# Patient Record
Sex: MALE | Race: WHITE | NOT HISPANIC OR LATINO | Employment: FULL TIME | URBAN - METROPOLITAN AREA
[De-identification: names, ages, dates, MRNs, and addresses within clinical notes are randomized per-mention and may not be internally consistent; named-entity substitution may affect disease eponyms.]

---

## 2023-12-09 DIAGNOSIS — E78.49 OTHER HYPERLIPIDEMIA: Primary | ICD-10-CM

## 2023-12-11 ENCOUNTER — TELEMEDICINE (OUTPATIENT)
Dept: BEHAVIORAL HEALTH | Facility: CLINIC | Age: 58
End: 2023-12-11
Payer: COMMERCIAL

## 2023-12-11 DIAGNOSIS — F33.42 RECURRENT MAJOR DEPRESSIVE DISORDER, IN FULL REMISSION (CMS-HCC): ICD-10-CM

## 2023-12-11 PROCEDURE — 99214 OFFICE O/P EST MOD 30 MIN: CPT

## 2023-12-11 RX ORDER — ATORVASTATIN CALCIUM 40 MG/1
40 TABLET, FILM COATED ORAL DAILY
Qty: 90 TABLET | Refills: 0 | Status: SHIPPED | OUTPATIENT
Start: 2023-12-11 | End: 2024-01-30 | Stop reason: SDUPTHER

## 2023-12-11 NOTE — PROGRESS NOTES
"Subjective   Patient ID: Garfield Blankenship is a 58 y.o. male  with past history of allergic rhinitis, anxiety, depression, vitamin D deficiency, and KINA with poor CPAP compliance. Patient presents for follow up evaluation of depression and anxiety      \"Chief Complaint -  Follow up for depression and anxiety      Patient provided 2 personal identifiers prior to virtual Teleconferencing appointment.     Patient reports that he is doing well and reports being at his \"status quo.\" He describes having no worries or concerns.     Patient is sleeping well and he describes his appetite as good.     Patient denying depressed mood, suicidal ideation, or homicidal ideation.     He denies daily worries, higher than normal baseline anxiety, and no panic attacks.     He is looking forward to his children coming to town for the holidays and he describes his wife as being busy getting the house in order for the holidays.      Patient keeps busy at work as an  at [employer].     Patient denying physical or mental health symptoms today.   Review of Systems     Depressive Symptoms: not depressed or irritable mood, ~no loss of interest,~no change in appetite,~no recent lb weight gain,~no recent lb weight loss,~no insomnia,~no fatigue or loss of energy,~not feeling worthless or guilty,~normal concentration,~ability to make decisions,~no suicidal ideation,~no guns or weapons in household.   Manic Symptoms: mood is not irritable or elevated,~self esteem is not grandiose or increased,~no changes in need for sleep,~not more talkative than usual,~does not have flight of ideas or racing thoughts,~no distractibility,~no psychomotor agitation or increased goal-directed activity,~no excessive involvement in pleasurable activities.   Psychotic Symptoms: no hallucinations,~no delusions,~no disorganized speech,~does not have disorganized behavior or catatonia,~no negative symptoms.   Anxiety Symptoms: mild  worry,~difficulty controlling " worry, but~no panic attacks,~no concerns about future panic attacks,~no worry about panic attack consequences,~no change in behavior due to panic attacks,~no increase in arousal,~not easily fatigued due to worry,~no difficulty concentrating due to worry,~no irritability due to worry,~no muscle tension due to worry,~no sleep disturbances due to worry,~no specific phobia,~no social phobia,~no obsessions,~no compulsions,~no exposure to traumatic event,~no re-experiencing of traumatic event,~no avoidance of stimuli and number of responsiveness,~no restlessness / feeling on edge due to worry.   Delirium/ Altered Mental Status Symptoms: no disturbances of consciousness,~no diminished ability to focus, sustain, shift attention,~no change in cognition or perceptual disturbances,~symptoms do not fluctuate during the course of the day,~general medical condition is not present.   Disordered Eating Symptoms: weight is not less than 85% of ideal body weight.   Post-traumatic stress disorder symptoms The patient is currently asymptomatic.   innattentive Symptoms: does not make careless mistakes often,~does not have difficulty paying attention,~not often disorganized,~does not lose things often,~is not easily distracted,~is not often forgetful,~does not avoid/dislike tasks with sustained mental effort,~listens when spoken to directly,~is able to follow instructions and finish schoolwork.   All other systems have been reviewed and are negative for complaint.      Constitutional: sleep apnea, but~normal sleeping,~no night wakings,~no snoring,~not a picky eater,~normal appetite,~no swallowing problems,~no night terrors,~no nightmares,~no restless sleep,~no snorts/gasps~and~no obesity.   Eyes: wears glasses/contacts.   ENT: no hearing loss.   Cardiovascular: no chest pain,~no palpitations~and~no syncope.   Respiratory: no wheezing~and~no asthma/reactive airway disease.   Gastrointestinal: no constipation,~no abdominal pain,~no  "nausea,~no vomiting,~no diarrhea,~no blood in stools~and~no reflux.   Genitourinary: no nocturnal enuresis~and~no incontinence.   Musculoskeletal: moving all extremities well and symmetrical,~no arthritis or joint problems,~no myalgias~and~no muscle weakness.   Neurological: no headache,~no head injury,~no seizures,~no staring spells~and~no loss of consciousness.   Endocrine: no temperature intolerance~and~good growth.   Hematologic/Lymphatic: no anemia~and~no lead poisoning.   All other systems have been reviewed and are negative for complaint.     Mental Status Exam     General: In no apparent distress, well developed, appears well nourished.   Appearance: well dressed, well groomed, appears stated age.   Attitude: Calm, cooperative   Behavior: attentive, engaged, good eye contact.   Motor Activity: Gait not assessed - virtual,.   Speech: Regular rate, rhythm, volume and tone, spontaneous, fluent.   Mood: \"Good\".   Affect: Euthymic .   Thought Process: Organized, linear, goal directed. Associations are logical.   Thought Content: Does not endorse suicidal or homicidal ideation, no delusions elicited.   Thought Perception: Does not endorse auditory or visual hallucinations, does not appear to be responding to hallucinatory stimuli.   Cognition: Alert, oriented x3. No deficits noted. Adequate fund of knowledge. No deficit in recent and remote memory. No deficits in attention, concentration or language.    Insight: Good, as patient recognizes symptoms of illness and need for recommended treatments.   Judgment: Can make reasonable decisions about ordinary activities of daily living and necessary medical care recommendations.      Lab Review:   No visits with results within 2 Month(s) from this visit.   Latest known visit with results is:   [facility]acy Encounter on 12/19/2022   Component Date Value    Color, Urine 12/19/2022 YELLOW     Appearance, Urine 12/19/2022 CLEAR     Specific Gravity, Urine 12/19/2022 1.025     pH, " Urine 12/19/2022 7.0     Protein, Urine 12/19/2022 NEGATIVE     Glucose, Urine 12/19/2022 NEGATIVE     Blood, Urine 12/19/2022 NEGATIVE     Ketones, Urine 12/19/2022 NEGATIVE     Bilirubin, Urine 12/19/2022 NEGATIVE     Urobilinogen, Urine 12/19/2022 <2.0     Nitrite, Urine 12/19/2022 NEGATIVE     Leukocyte Esterase, Urine 12/19/2022 NEGATIVE     Vitamin D, 25-Hydroxy 12/19/2022 28 (A)     Glucose 12/19/2022 94     Sodium 12/19/2022 141     Potassium 12/19/2022 4.0     Chloride 12/19/2022 103     Bicarbonate 12/19/2022 31     Anion Gap 12/19/2022 11     Urea Nitrogen 12/19/2022 13     Creatinine 12/19/2022 1.21     GFR MALE 12/19/2022 69     Calcium 12/19/2022 9.9     Albumin 12/19/2022 4.7     Alkaline Phosphatase 12/19/2022 66     Total Protein 12/19/2022 7.4     AST 12/19/2022 21     Total Bilirubin 12/19/2022 0.7     ALT (SGPT) 12/19/2022 22     WBC 12/19/2022 6.2     nRBC 12/19/2022 0.0     RBC 12/19/2022 5.35     Hemoglobin 12/19/2022 15.4     Hematocrit 12/19/2022 46.2     MCV 12/19/2022 86     MCHC 12/19/2022 33.3     Platelets 12/19/2022 281     RDW 12/19/2022 12.6     Neutrophils % 12/19/2022 55.2     Immature Granulocytes %,* 12/19/2022 0.3     Lymphocytes % 12/19/2022 20.7     Monocytes % 12/19/2022 10.1     Eosinophils % 12/19/2022 11.9     Basophils % 12/19/2022 1.8     Neutrophils Absolute 12/19/2022 3.43     Lymphocytes Absolute 12/19/2022 1.29     Monocytes Absolute 12/19/2022 0.63     Eosinophils Absolute 12/19/2022 0.74 (H)     Basophils Absolute 12/19/2022 0.11 (H)     TSH 12/19/2022 2.41     PSA 12/19/2022 1.21     Cholesterol 12/19/2022 183     HDL 12/19/2022 31.2 (A)     Cholesterol/HDL Ratio 12/19/2022 5.9 (A)     LDL 12/19/2022 114 (H)     VLDL 12/19/2022 38     Triglycerides 12/19/2022 188 (H)        Assessment/Plan   Safety Assessment: no current or past SI, no SA, no guns. RF include age, male gender, long history of depression. PF include , connected family, limited substance use  "history future focused, hopeful. low risk    Diagnoses and all orders for this visit: Patient reports that he is doing well and has no physical or mental health complaints. Patient's low mood in the summer was likely attributed to \"empty nest syndrome\" since all of his children live outside of the home. Patient is coping well with the change is reports that things are now \"status quo.\" Patient remains on off-label Zoloft but he has tolerate the medication well and has shown no symptoms of Serotonin syndrome. Will follow up in 6 months.    Greater than 50% of today's appointment consisted of supportive therapy, counseling, psych-education, and care coordination .   Recurrent major depressive disorder, in full remission (CMS/[facility])  CONTINUE : 450 mg Wellbutrin daily for depression  CONTINUE: 250 mg Zoloft daily for depression and anxiety  CONTINUE: 15 mg Abilify daily for depression      Follow up in 6 months    Time     2 minutes chart review  25 minute direct patient contact  5 minutes charting     Total Time 32 minutes      "

## 2023-12-18 PROBLEM — E78.5 HYPERLIPIDEMIA: Status: ACTIVE | Noted: 2023-12-18

## 2023-12-18 PROBLEM — D18.01 HEMANGIOMA OF SKIN AND SUBCUTANEOUS TISSUE: Status: ACTIVE | Noted: 2020-09-24

## 2023-12-18 PROBLEM — D22.60 MELANOCYTIC NEVI OF UNSPECIFIED UPPER LIMB, INCLUDING SHOULDER: Status: ACTIVE | Noted: 2020-09-24

## 2023-12-18 PROBLEM — R53.83 FATIGUE: Status: ACTIVE | Noted: 2023-12-18

## 2023-12-18 PROBLEM — J01.00 ACUTE MAXILLARY SINUSITIS: Status: ACTIVE | Noted: 2023-12-18

## 2023-12-18 PROBLEM — J45.990 EXERCISE-INDUCED BRONCHOSPASM (HHS-HCC): Status: ACTIVE | Noted: 2023-12-18

## 2023-12-18 PROBLEM — J30.9 ALLERGIC RHINITIS: Status: ACTIVE | Noted: 2023-12-18

## 2023-12-18 PROBLEM — D22.30 MELANOCYTIC NEVI OF UNSPECIFIED PART OF FACE: Status: ACTIVE | Noted: 2020-09-24

## 2023-12-18 PROBLEM — D22.5 MELANOCYTIC NEVI OF TRUNK: Status: ACTIVE | Noted: 2020-09-24

## 2023-12-18 PROBLEM — E55.9 VITAMIN D DEFICIENCY: Status: ACTIVE | Noted: 2023-12-18

## 2023-12-18 PROBLEM — D22.70 MELANOCYTIC NEVI OF UNSPECIFIED LOWER LIMB, INCLUDING HIP: Status: ACTIVE | Noted: 2020-09-24

## 2023-12-18 PROBLEM — R94.31 LEFT AXIS DEVIATION: Status: ACTIVE | Noted: 2023-12-18

## 2023-12-18 PROBLEM — G47.33 OSA (OBSTRUCTIVE SLEEP APNEA): Status: ACTIVE | Noted: 2023-12-18

## 2023-12-18 PROBLEM — J32.8 OTHER CHRONIC SINUSITIS: Status: ACTIVE | Noted: 2023-12-18

## 2023-12-18 PROBLEM — G47.19 EXCESSIVE DAYTIME SLEEPINESS: Status: ACTIVE | Noted: 2023-12-18

## 2023-12-18 PROBLEM — L65.9 NONSCARRING HAIR LOSS, UNSPECIFIED: Status: ACTIVE | Noted: 2020-09-24

## 2023-12-18 PROBLEM — R05.9 COUGH: Status: ACTIVE | Noted: 2023-12-18

## 2023-12-18 PROBLEM — R07.9 CHEST PAIN: Status: ACTIVE | Noted: 2023-12-18

## 2023-12-18 PROBLEM — L82.1 OTHER SEBORRHEIC KERATOSIS: Status: ACTIVE | Noted: 2020-09-24

## 2023-12-18 PROBLEM — L98.8 RHYTIDOSIS FACIALIS: Status: ACTIVE | Noted: 2020-09-24

## 2023-12-18 PROBLEM — F41.8 DEPRESSION WITH ANXIETY: Status: ACTIVE | Noted: 2023-12-18

## 2023-12-18 PROBLEM — L03.011 CELLULITIS OF FINGER OF RIGHT HAND: Status: ACTIVE | Noted: 2023-12-18

## 2023-12-18 PROBLEM — M25.641 STIFFNESS OF FINGER JOINT OF RIGHT HAND: Status: ACTIVE | Noted: 2023-12-18

## 2023-12-18 PROBLEM — R91.1 PULMONARY NODULE: Status: ACTIVE | Noted: 2023-12-18

## 2023-12-18 PROBLEM — L73.9 FOLLICULAR DISORDER, UNSPECIFIED: Status: ACTIVE | Noted: 2020-09-24

## 2023-12-18 RX ORDER — ATORVASTATIN CALCIUM 40 MG/1
1 TABLET, FILM COATED ORAL DAILY
COMMUNITY
Start: 2017-05-22 | End: 2024-01-22 | Stop reason: WASHOUT

## 2023-12-18 RX ORDER — ARIPIPRAZOLE 15 MG/1
7.5 TABLET ORAL DAILY
COMMUNITY
Start: 2023-09-11 | End: 2024-03-10 | Stop reason: SINTOL

## 2023-12-18 RX ORDER — BUPROPION HYDROCHLORIDE 150 MG/1
TABLET, EXTENDED RELEASE ORAL
COMMUNITY
Start: 2023-09-10 | End: 2024-06-05

## 2023-12-18 RX ORDER — SERTRALINE HYDROCHLORIDE 100 MG/1
250 TABLET, FILM COATED ORAL DAILY
COMMUNITY
End: 2024-04-29

## 2023-12-18 RX ORDER — BUPROPION HYDROCHLORIDE 150 MG/1
1 TABLET ORAL DAILY
COMMUNITY
Start: 2014-01-29 | End: 2024-01-07 | Stop reason: DRUGHIGH

## 2023-12-18 RX ORDER — ERGOCALCIFEROL 1.25 MG/1
1 CAPSULE ORAL
COMMUNITY
Start: 2023-02-26 | End: 2024-01-22 | Stop reason: WASHOUT

## 2023-12-19 ENCOUNTER — OFFICE VISIT (OUTPATIENT)
Dept: PRIMARY CARE | Facility: CLINIC | Age: 58
End: 2023-12-19
Payer: COMMERCIAL

## 2023-12-19 ENCOUNTER — LAB (OUTPATIENT)
Dept: LAB | Facility: LAB | Age: 58
End: 2023-12-19
Payer: COMMERCIAL

## 2023-12-19 VITALS
HEIGHT: 69 IN | DIASTOLIC BLOOD PRESSURE: 72 MMHG | BODY MASS INDEX: 30.63 KG/M2 | SYSTOLIC BLOOD PRESSURE: 122 MMHG | WEIGHT: 206.79 LBS

## 2023-12-19 DIAGNOSIS — E78.49 OTHER HYPERLIPIDEMIA: ICD-10-CM

## 2023-12-19 DIAGNOSIS — Z00.00 HEALTH MAINTENANCE EXAMINATION: ICD-10-CM

## 2023-12-19 DIAGNOSIS — E11.9 TYPE 2 DIABETES MELLITUS WITHOUT COMPLICATION, WITHOUT LONG-TERM CURRENT USE OF INSULIN (MULTI): ICD-10-CM

## 2023-12-19 DIAGNOSIS — Z00.00 HEALTH CARE MAINTENANCE: ICD-10-CM

## 2023-12-19 DIAGNOSIS — E78.49 OTHER HYPERLIPIDEMIA: Primary | ICD-10-CM

## 2023-12-19 LAB
ALBUMIN SERPL BCP-MCNC: 5 G/DL (ref 3.4–5)
ALP SERPL-CCNC: 61 U/L (ref 33–136)
ALT SERPL W P-5'-P-CCNC: 21 U/L (ref 10–52)
ANION GAP SERPL CALC-SCNC: 12 MMOL/L (ref 10–20)
AST SERPL W P-5'-P-CCNC: 17 U/L (ref 9–39)
BASOPHILS # BLD AUTO: 0.1 X10*3/UL (ref 0–0.1)
BASOPHILS NFR BLD AUTO: 1.6 %
BILIRUB SERPL-MCNC: 0.5 MG/DL (ref 0–1.2)
BUN SERPL-MCNC: 16 MG/DL (ref 6–23)
CALCIUM SERPL-MCNC: 10.2 MG/DL (ref 8.6–10.6)
CHLORIDE SERPL-SCNC: 103 MMOL/L (ref 98–107)
CHOLEST SERPL-MCNC: 180 MG/DL (ref 0–199)
CHOLESTEROL/HDL RATIO: 5.5
CO2 SERPL-SCNC: 31 MMOL/L (ref 21–32)
CREAT SERPL-MCNC: 1.15 MG/DL (ref 0.5–1.3)
EOSINOPHIL # BLD AUTO: 0.22 X10*3/UL (ref 0–0.7)
EOSINOPHIL NFR BLD AUTO: 3.5 %
ERYTHROCYTE [DISTWIDTH] IN BLOOD BY AUTOMATED COUNT: 12.8 % (ref 11.5–14.5)
GFR SERPL CREATININE-BSD FRML MDRD: 73 ML/MIN/1.73M*2
GLUCOSE SERPL-MCNC: 88 MG/DL (ref 74–99)
HCT VFR BLD AUTO: 46.5 % (ref 41–52)
HCV AB SER QL: NONREACTIVE
HDLC SERPL-MCNC: 32.8 MG/DL
HGB BLD-MCNC: 15.7 G/DL (ref 13.5–17.5)
HOLD SPECIMEN: NORMAL
IMM GRANULOCYTES # BLD AUTO: 0.02 X10*3/UL (ref 0–0.7)
IMM GRANULOCYTES NFR BLD AUTO: 0.3 % (ref 0–0.9)
LDLC SERPL CALC-MCNC: 113 MG/DL
LYMPHOCYTES # BLD AUTO: 1.54 X10*3/UL (ref 1.2–4.8)
LYMPHOCYTES NFR BLD AUTO: 24.4 %
MCH RBC QN AUTO: 29.2 PG (ref 26–34)
MCHC RBC AUTO-ENTMCNC: 33.8 G/DL (ref 32–36)
MCV RBC AUTO: 87 FL (ref 80–100)
MONOCYTES # BLD AUTO: 0.71 X10*3/UL (ref 0.1–1)
MONOCYTES NFR BLD AUTO: 11.3 %
NEUTROPHILS # BLD AUTO: 3.71 X10*3/UL (ref 1.2–7.7)
NEUTROPHILS NFR BLD AUTO: 58.9 %
NON HDL CHOLESTEROL: 147 MG/DL (ref 0–149)
NRBC BLD-RTO: 0 /100 WBCS (ref 0–0)
PLATELET # BLD AUTO: 293 X10*3/UL (ref 150–450)
POTASSIUM SERPL-SCNC: 4.3 MMOL/L (ref 3.5–5.3)
PROT SERPL-MCNC: 7.5 G/DL (ref 6.4–8.2)
PSA SERPL-MCNC: 1.54 NG/ML
RBC # BLD AUTO: 5.37 X10*6/UL (ref 4.5–5.9)
SODIUM SERPL-SCNC: 142 MMOL/L (ref 136–145)
TRIGL SERPL-MCNC: 170 MG/DL (ref 0–149)
VLDL: 34 MG/DL (ref 0–40)
WBC # BLD AUTO: 6.3 X10*3/UL (ref 4.4–11.3)

## 2023-12-19 PROCEDURE — 99396 PREV VISIT EST AGE 40-64: CPT | Performed by: INTERNAL MEDICINE

## 2023-12-19 PROCEDURE — 3074F SYST BP LT 130 MM HG: CPT | Performed by: INTERNAL MEDICINE

## 2023-12-19 PROCEDURE — 3078F DIAST BP <80 MM HG: CPT | Performed by: INTERNAL MEDICINE

## 2023-12-19 PROCEDURE — 36415 COLL VENOUS BLD VENIPUNCTURE: CPT

## 2023-12-19 PROCEDURE — 81003 URINALYSIS AUTO W/O SCOPE: CPT

## 2023-12-19 PROCEDURE — 84153 ASSAY OF PSA TOTAL: CPT

## 2023-12-19 PROCEDURE — 90471 IMMUNIZATION ADMIN: CPT | Performed by: INTERNAL MEDICINE

## 2023-12-19 PROCEDURE — 99213 OFFICE O/P EST LOW 20 MIN: CPT | Performed by: INTERNAL MEDICINE

## 2023-12-19 PROCEDURE — 86803 HEPATITIS C AB TEST: CPT

## 2023-12-19 PROCEDURE — 1036F TOBACCO NON-USER: CPT | Performed by: INTERNAL MEDICINE

## 2023-12-19 PROCEDURE — 80061 LIPID PANEL: CPT

## 2023-12-19 PROCEDURE — 80053 COMPREHEN METABOLIC PANEL: CPT

## 2023-12-19 PROCEDURE — 90686 IIV4 VACC NO PRSV 0.5 ML IM: CPT | Performed by: INTERNAL MEDICINE

## 2023-12-19 PROCEDURE — 85025 COMPLETE CBC W/AUTO DIFF WBC: CPT

## 2023-12-19 ASSESSMENT — ENCOUNTER SYMPTOMS
NEUROLOGICAL NEGATIVE: 1
DYSPHORIC MOOD: 0
ACTIVITY CHANGE: 0
AGITATION: 0
SHORTNESS OF BREATH: 0
GASTROINTESTINAL NEGATIVE: 1
ABDOMINAL DISTENTION: 0

## 2023-12-19 NOTE — PROGRESS NOTES
"Subjective   Patient ID: Garfield Blankenship is a 58 y.o. male who presents for Annual Exam.    HPI   59 yo wm for annual visiiiit    Exercise no      Review of Systems   Constitutional:  Negative for activity change.   HENT: Negative.     Respiratory:  Negative for shortness of breath.    Cardiovascular:  Negative for chest pain.   Gastrointestinal: Negative.  Negative for abdominal distention.   Neurological: Negative.    Psychiatric/Behavioral:  Negative for agitation and dysphoric mood.        Objective   /72 (BP Location: Left arm, Patient Position: Sitting)   Ht 1.753 m (5' 9\")   Wt 93.8 kg (206 lb 12.7 oz)   BMI 30.54 kg/m²     Physical Exam  Constitutional:       Appearance: Normal appearance. He is obese.   HENT:      Right Ear: Tympanic membrane and ear canal normal.      Left Ear: Tympanic membrane and ear canal normal.   Eyes:      Conjunctiva/sclera: Conjunctivae normal.   Neck:      Vascular: No carotid bruit.   Cardiovascular:      Rate and Rhythm: Regular rhythm.      Heart sounds: Normal heart sounds.   Pulmonary:      Breath sounds: Normal breath sounds.   Abdominal:      General: Abdomen is flat. Bowel sounds are normal.      Palpations: Abdomen is soft.   Genitourinary:     Penis: Normal.       Testes: Normal.      Prostate: Normal.      Rectum: Normal.   Musculoskeletal:         General: Signs of injury (Right shoulder tendinosis seeing orthopedic and PT) present.      Cervical back: No rigidity or tenderness.   Lymphadenopathy:      Cervical: No cervical adenopathy.   Skin:     Findings: No lesion.   Neurological:      General: No focal deficit present.      Mental Status: He is alert.   Psychiatric:         Mood and Affect: Mood normal.         Assessment/Plan   Diagnoses and all orders for this visit:  Other hyperlipidemia  -     Comprehensive Metabolic Panel; Future  -     Lipid Panel; Future  Health maintenance examination and cancer screening  -     Colonoscopy Screening; Average Risk " Patient; Future  -     Hepatitis C antibody; Future  -     CBC and Auto Differential; Future  -     Prostate Specific Antigen; Future  Health care maintenance  -     Comprehensive Metabolic Panel; Future  Type 2 diabetes mellitus without complication, without long-term current use of insulin (CMS/HCC)  -     Urinalysis with Reflex Microscopic and Culture; Future  Other orders  -     Flu vaccine (IIV4) age 6 months and greater, preservative free  Obesity weight is up a little bit he can help by starting to try to get 150 minutes of aerobic exercise per week I would say start off gradually [place] maybe 20 to 30 minutes on Saturday and Sunday and maybe 1 day during the week go out for a walk.  This will get you close help with your weight

## 2023-12-20 ENCOUNTER — DOCUMENTATION (OUTPATIENT)
Dept: PRIMARY CARE | Facility: CLINIC | Age: 58
End: 2023-12-20
Payer: COMMERCIAL

## 2023-12-20 DIAGNOSIS — Z00.00 HEALTH CARE MAINTENANCE: ICD-10-CM

## 2023-12-20 DIAGNOSIS — E78.49 OTHER HYPERLIPIDEMIA: Primary | ICD-10-CM

## 2023-12-20 LAB
APPEARANCE UR: CLEAR
BILIRUB UR STRIP.AUTO-MCNC: NEGATIVE MG/DL
COLOR UR: YELLOW
GLUCOSE UR STRIP.AUTO-MCNC: NEGATIVE MG/DL
KETONES UR STRIP.AUTO-MCNC: NEGATIVE MG/DL
LEUKOCYTE ESTERASE UR QL STRIP.AUTO: NEGATIVE
NITRITE UR QL STRIP.AUTO: NEGATIVE
PH UR STRIP.AUTO: 5 [PH]
PROT UR STRIP.AUTO-MCNC: NEGATIVE MG/DL
RBC # UR STRIP.AUTO: NEGATIVE /UL
SP GR UR STRIP.AUTO: 1.02
UROBILINOGEN UR STRIP.AUTO-MCNC: <2 MG/DL

## 2023-12-20 NOTE — PROGRESS NOTES
I spoke with patient regarding his lab results and he mentioned that he forgot to tell me his hand was twitching.  This may be a side effect to his psych meds however I recommend he follow-up with me in January.  This will correlate with getting a repeat lipid panel and CMP on his new dose of atorvastatin 80 mg

## 2023-12-20 NOTE — PROGRESS NOTES
Patient was having some hand twitching he mention this when I was calling him to give him blood work results recommend follow-up after the first of the year

## 2024-01-08 NOTE — PATIENT INSTRUCTIONS
1. Reviewed diagnostic impression including subjective and objective data and provided education about depression and anxiety disorder, etiology, treatment recommendations including medication, therapy, course of treatment and prognosis. Patient amendable to treatment plan.     2. Recommendations - Your doing well, continue current medications and consider increasing the amount of physical exercise that you attain weekly.     CONTINUE : 450 mg Wellbutrin daily for depression  CONTINUE: 250 mg Zoloft daily for depression and anxiety  CONTINUE: 15 mg Abilify daily for depression      Maintain exercise and consider light box therapy for the winter months.      Reviewed r/b/a, possible side effects of the medication(s). Client is aware benefit/risks      4. Labs reviewed and discussed     5. Follow up with physical health providers as scheduled     6.. Follow up in 6 months      May follow up sooner if experiences worsening symptoms by calling  Psychiatry at [phone]      Patient verbalized an understanding to call [facility] at [phone] ([facility]), 211, or 551/go to the nearest emergency room if experiences thoughts of harm to self or others.

## 2024-01-22 ENCOUNTER — OFFICE VISIT (OUTPATIENT)
Dept: CARDIOLOGY | Facility: CLINIC | Age: 59
End: 2024-01-22
Payer: COMMERCIAL

## 2024-01-22 VITALS
DIASTOLIC BLOOD PRESSURE: 78 MMHG | HEART RATE: 83 BPM | HEIGHT: 69 IN | BODY MASS INDEX: 31.09 KG/M2 | OXYGEN SATURATION: 95 % | RESPIRATION RATE: 18 BRPM | SYSTOLIC BLOOD PRESSURE: 133 MMHG | WEIGHT: 209.9 LBS

## 2024-01-22 DIAGNOSIS — E78.49 OTHER HYPERLIPIDEMIA: ICD-10-CM

## 2024-01-22 PROCEDURE — 93005 ELECTROCARDIOGRAM TRACING: CPT | Performed by: STUDENT IN AN ORGANIZED HEALTH CARE EDUCATION/TRAINING PROGRAM

## 2024-01-22 PROCEDURE — 99214 OFFICE O/P EST MOD 30 MIN: CPT | Performed by: STUDENT IN AN ORGANIZED HEALTH CARE EDUCATION/TRAINING PROGRAM

## 2024-01-22 PROCEDURE — 93010 ELECTROCARDIOGRAM REPORT: CPT | Performed by: STUDENT IN AN ORGANIZED HEALTH CARE EDUCATION/TRAINING PROGRAM

## 2024-01-22 PROCEDURE — 1036F TOBACCO NON-USER: CPT | Performed by: STUDENT IN AN ORGANIZED HEALTH CARE EDUCATION/TRAINING PROGRAM

## 2024-01-22 PROCEDURE — 99204 OFFICE O/P NEW MOD 45 MIN: CPT | Performed by: STUDENT IN AN ORGANIZED HEALTH CARE EDUCATION/TRAINING PROGRAM

## 2024-01-22 ASSESSMENT — PATIENT HEALTH QUESTIONNAIRE - PHQ9
2. FEELING DOWN, DEPRESSED OR HOPELESS: NOT AT ALL
SUM OF ALL RESPONSES TO PHQ9 QUESTIONS 1 AND 2: 0
1. LITTLE INTEREST OR PLEASURE IN DOING THINGS: NOT AT ALL

## 2024-01-22 ASSESSMENT — ENCOUNTER SYMPTOMS: DEPRESSION: 0

## 2024-01-22 NOTE — PROGRESS NOTES
Primary Care Physician: Ricardo Ibrahim MD   Date of Visit: 01/22/2024  8:40 AM EST  Type of Visit: new      Chief Complaint:  Chief Complaint   Patient presents with    New Patient Visit     Referred by Dr. Ibrahim for hyperlipidemia        HPI  Garfield NOVA Krzysztof 58 y.o. male with hx of referred for HLD, and CAC 8 in LAD referred for HLD     He is active, goes on treadmill  No chest pain or sob   No le edema     Lipitor dose was recently increased to 80 mg every day (a few weeks ago)    Does not tolerate CPAP       Dad passed away at 55 year old due to MI            Review of Systems   Review of Systems   12 points review of systems are negative expect for the above    Social History:  Social History     Socioeconomic History    Marital status:      Spouse name: Not on file    Number of children: Not on file    Years of education: Not on file    Highest education level: Not on file   Occupational History    Not on file   Tobacco Use    Smoking status: Never    Smokeless tobacco: Never   Substance and Sexual Activity    Alcohol use: Yes     Comment: Rarely a beer or two week    Drug use: Never    Sexual activity: Not on file   Other Topics Concern    Not on file   Social History Narrative    Not on file     Social Determinants of Health     Financial Resource Strain: Not on file   Food Insecurity: Not on file   Transportation Needs: Not on file   Physical Activity: Not on file   Stress: Not on file   Social Connections: Not on file   Intimate Partner Violence: Not on file   Housing Stability: Not on file        Past Medical History:  Past Medical History:   Diagnosis Date    Hyperlipidemia, unspecified 12/19/2022    Hyperlipidemia    Personal history of other mental and behavioral disorders 01/29/2014    History of depression    Snoring 04/06/2016    Snoring    Superficial foreign body of unspecified finger, initial encounter 08/21/2020    Foreign body of finger with infection, initial encounter     "Superficial foreign body of unspecified finger, initial encounter 08/31/2020    Splinter of finger       Past Surgical History:  Past Surgical History:   Procedure Laterality Date    OTHER SURGICAL HISTORY  01/22/2018    Primary Repair Of Ruptured Achilles Tendon       Family History:  No family history on file.     Objective:       9/29/2021    10:07 AM 11/2/2021     8:15 AM 2/15/2022     8:12 AM 4/6/2022     4:54 PM 12/19/2022     8:30 AM 12/19/2023     7:40 AM 1/22/2024     8:26 AM   Vitals   Systolic 128 110 130 136 122 122 133   Diastolic 87 60 80 84 73 72 78   Heart Rate 74 76 74 73 76  83   Temp  36.3 °C (97.4 °F) 36.6 °C (97.9 °F)  36.4 °C (97.6 °F)     Resp 16   16 16  18   Height (in) 1.753 m (5' 9\")   1.753 m (5' 9\") 1.753 m (5' 9\") 1.753 m (5' 9\") 1.753 m (5' 9\")   Weight (lb) 199.06 197 200 203 210 206.79 209.9   BMI 29.4 kg/m2 29.09 kg/m2 29.53 kg/m2 29.98 kg/m2 31.01 kg/m2 30.54 kg/m2 31 kg/m2   BSA (m2) 2.1 m2 2.09 m2 2.1 m2 2.12 m2 2.15 m2 2.14 m2 2.15 m2   Visit Report      Report Report      Constitutional:       Appearance: Healthy appearance. Not in distress.   Neck:      Vascular: No JVR. JVD normal.   Pulmonary:      Effort: Pulmonary effort is normal.      Breath sounds: Normal breath sounds. No wheezing. No rhonchi. No rales.   Chest:      Chest wall: Not tender to palpatation.   Cardiovascular:      PMI at left midclavicular line. Normal rate. Regular rhythm. Normal S1. Normal S2.       Murmurs: There is no murmur.      No gallop.  No click. No rub.   Pulses:     Intact distal pulses.   Edema:     Peripheral edema absent.   Abdominal:      General: Bowel sounds are normal.      Palpations: Abdomen is soft.      Tenderness: There is no abdominal tenderness.   Musculoskeletal: Normal range of motion.         General: No tenderness.   Skin:     General: Skin is warm and dry.   Neurological:      General: No focal deficit present.      Mental Status: Alert and oriented to person, place and " time.     Allergies:  No Known Allergies    Medications:  Current Outpatient Medications   Medication Instructions    ARIPiprazole (ABILIFY) 7.5 mg, oral, Daily    atorvastatin (LIPITOR) 40 mg, oral, Daily    buPROPion SR (Wellbutrin SR) 150 mg 12 hr tablet TAKE THREE (3) TABLETS BY MOUTH DAILY FOR DEPRESSION    sertraline (ZOLOFT) 250 mg, oral, Daily, take two and a half (2-1/2) tablets daily        Labs and Imaging:     Lab Results   Component Value Date    WBC 6.3 12/19/2023    HGB 15.7 12/19/2023    HCT 46.5 12/19/2023     12/19/2023    CHOL 180 12/19/2023    TRIG 170 (H) 12/19/2023    HDL 32.8 12/19/2023    ALT 21 12/19/2023    AST 17 12/19/2023     12/19/2023    K 4.3 12/19/2023     12/19/2023    CREATININE 1.15 12/19/2023    BUN 16 12/19/2023    CO2 31 12/19/2023    TSH 2.41 12/19/2022    PSA 1.54 12/19/2023         Echocardiogram: No results found for this or any previous visit from the past 1825 days.    Stress Testing: No results found for this or any previous visit from the past 1825 days.    Cardiac Catheterization: No results found for this or any previous visit from the past 1825 days.    Cardiac Scoring:   CT HEART CALCIUM SCORING WO IV CONTRAST 11/05/2021    Narrative  MRN: [id number]  Patient Name: JOVANNY AGUILAR    STUDY:  CT CARDIAC SCORING;  11/5/2021 9:16 am    INDICATION:  hyperlipidemia.    COMPARISON:  11/20/2015    ACCESSION NUMBER(S):  [id number]    ORDERING CLINICIAN:  PORSHA SPRINGER    TECHNIQUE:  Using prospective ECG gating, CT scan of the coronary arteries was  performed without intravenous contrast. Coronary calcium scoring  was  performed according to the method of Agatston.    FINDINGS:  The score and distribution of calcium in the coronary arteries is as  follows:    LM 0  LAD 8  LCx 0  RCA 0    Total 8    The visualized mid/lower ascending thoracic aorta measures 3.6 cm in  diameter. The heart is normal in size. No pericardial effusion is  present.    No gross  "evidence of mediastinal or hilar lymphadenopathy or masses  is identified. The visualized segments of the lungs are  hyperinflated.. Multiple punctate nodules bilaterally with the  largest in the right middle lobe measuring 5 mm.    The visualized subdiaphragmatic structures appear intact.    Impression  1. Coronary artery calcium score of 8*.    *Coronary artery calcium scoring may be helpful in predicting the  risk for future coronary heart disease events.  According to the  American College of Cardiology Foundation Clinical Expert Consensus  Task Force, such testing provides important prognostic information in  patients with more than one coronary heart disease risk factor. The  coronary artery calcium score correlates with the annual risk of a  non-fatal myocardial infarction or coronary heart disease death.    Coronary artery score            Annual Risk    0-99                             0.4%  100-399                        1.3%  >400                            2.4%    These three \"breakpoints\" correspond to lower, intermediate and high  risk states for future coronary events.  Such information should be  used, along with appropriate clinical judgment, to make decisions  regarding the intensity of risk factor management strategies to treat  blood lipids and to modify other non-lipid coronary risk factors.    Reference: [place] P et al. Circulation.  2007; 115:402-426    2.  Multiple punctate nodules bilaterally with the largest in the  right middle lobe measuring 5 mm.    *Follow-up recommendations according to the Fleischner Society  Criteria (Lang Katehoimmanuel et al., Guidelines for management of  incidental pulmonary nodules detected on CT images: From the  Fleischner Society 2017, DOI:  http://dx.doi.org/10.1148/radiol.3110264403.)  Dimensions are average of long and short axis, rounded to the nearest  millimeter. Consider all relevant risk factors.  SOLID NODULES:  SINGLE NODULE:  Low Risk:  < 6 mm No " routine follow-up  6-8 mm CT at 6-12 months, then consider CT at 18-24 months  > 8 mm Consider CT at 3 months, PET/CT, or tissue sampling  Nodules <6 mm do not require routine follow-up, but certain patients  at high risk with suspicious nodule morphology, upper lobe location,  or both may warrant 12-month follow-up (recommendation 1A).  High risk:  < 6 mm Optional CT at 12 months  6-8 mm CT at 6-12 months, then CT at 18-24 months  > 8 mm Consider CT at 3 months, PET/CT, or tissue sampling  Nodules < 6 mm do not require routine follow-up, but certain patients  at high risk with suspicious nodule morphology, upper lobe location,  or both may warrant 12-month follow-up (recommendation 1A).    MULTIPLE NODULES:  Low risk:  < 6 mm No routine follow-up  6-8 mm CT at 3-6 months, then consider CT at 18-24 months  > 8 mm CT at 3-6 months, then consider CT at 18-24 months  Use most suspicious nodule as guide to management. Follow-up  intervals may vary according to size and risk (recommendation 2A).  High risk:  < 6 mm Optional CT at 12 months  6-8 mm CT at 3-6 months, then at 18-24 months  > 8 mm CT at 3-6 months, then at 18-24 months    Use most suspicious nodule as guide to management. Follow-up  intervals may vary according to size and risk (recommendation 2A).    AAA : No results found for this or any previous visit from the past 1825 days.    OTHER: No results found for this or any previous visit from the past 1825 days.      The 10-year ASCVD risk score (Radha ZHU, et al., 2019) is: 20.6%    Values used to calculate the score:      Age: 58 years      Sex: Male      Is Non- : No      Diabetic: Yes      Tobacco smoker: No      Systolic Blood Pressure: 133 mmHg      Is BP treated: No      HDL Cholesterol: 32.8 mg/dL      Total Cholesterol: 180 mg/dL     Assessment/Plan:   1. Other hyperlipidemia  Referral to Cardiology         Garfield P Krzysztof 58 y.o. male with hx of referred for HLD, CAC 8 in LAD,  mild KINA and +ve Fhx of premature CAD referred for HLD     EKG today with NSR and LAD  Had a normal stress test done 6 years ago  Asymptomatic and active     ASCVD score was 18% based on prior BP number and today 20% based on today's BP number.       Plan  Dietician referral   Encourage exercise, weight loss and healthy diet  Agree with HI statin. target LDL 70 - 100 if not achievable, consider adding zetia , he will repeat lipid panel after 3 months   Add fish oil OTC 1 gm bid  Recommend KINA, CPAP use at least >4 hrs is recommend to decrease future MACE    He will continue to follow up with his PCP  Thank you for the referral       Time Spent: I spent minutes reviewing medical testing, obtaining medical history and counselling and educating on diagnosis and documenting clinical encounter.         ____________________________________________________________  Ravi Cameron MD   of Medicine  Division of Cardiovascular Medicine   [facility]

## 2024-01-30 DIAGNOSIS — E78.49 OTHER HYPERLIPIDEMIA: ICD-10-CM

## 2024-01-30 RX ORDER — ATORVASTATIN CALCIUM 40 MG/1
40 TABLET, FILM COATED ORAL DAILY
Qty: 90 TABLET | Refills: 0 | Status: SHIPPED | OUTPATIENT
Start: 2024-01-30 | End: 2024-03-06 | Stop reason: ALTCHOICE

## 2024-01-31 LAB
ATRIAL RATE: 84 BPM
P AXIS: 56 DEGREES
P OFFSET: 187 MS
P ONSET: 130 MS
PR INTERVAL: 182 MS
Q ONSET: 221 MS
QRS COUNT: 13 BEATS
QRS DURATION: 106 MS
QT INTERVAL: 366 MS
QTC CALCULATION(BAZETT): 432 MS
QTC FREDERICIA: 409 MS
R AXIS: -40 DEGREES
T AXIS: 23 DEGREES
T OFFSET: 404 MS
VENTRICULAR RATE: 84 BPM

## 2024-02-27 ENCOUNTER — LAB (OUTPATIENT)
Dept: LAB | Facility: LAB | Age: 59
End: 2024-02-27
Payer: COMMERCIAL

## 2024-02-27 DIAGNOSIS — Z00.00 HEALTH CARE MAINTENANCE: ICD-10-CM

## 2024-02-27 DIAGNOSIS — E78.49 OTHER HYPERLIPIDEMIA: ICD-10-CM

## 2024-02-27 LAB
ALBUMIN SERPL BCP-MCNC: 5 G/DL (ref 3.4–5)
ALP SERPL-CCNC: 67 U/L (ref 33–136)
ALT SERPL W P-5'-P-CCNC: 22 U/L (ref 10–52)
ANION GAP SERPL CALC-SCNC: 10 MMOL/L (ref 10–20)
AST SERPL W P-5'-P-CCNC: 19 U/L (ref 9–39)
BILIRUB SERPL-MCNC: 0.6 MG/DL (ref 0–1.2)
BUN SERPL-MCNC: 15 MG/DL (ref 6–23)
CALCIUM SERPL-MCNC: 10.4 MG/DL (ref 8.6–10.6)
CHLORIDE SERPL-SCNC: 105 MMOL/L (ref 98–107)
CHOLEST SERPL-MCNC: 135 MG/DL (ref 0–199)
CHOLESTEROL/HDL RATIO: 4.2
CO2 SERPL-SCNC: 34 MMOL/L (ref 21–32)
CREAT SERPL-MCNC: 1 MG/DL (ref 0.5–1.3)
EGFRCR SERPLBLD CKD-EPI 2021: 86 ML/MIN/1.73M*2
GLUCOSE SERPL-MCNC: 86 MG/DL (ref 74–99)
HDLC SERPL-MCNC: 32.1 MG/DL
LDLC SERPL CALC-MCNC: 79 MG/DL
NON HDL CHOLESTEROL: 103 MG/DL (ref 0–149)
POTASSIUM SERPL-SCNC: 4.6 MMOL/L (ref 3.5–5.3)
PROT SERPL-MCNC: 7.4 G/DL (ref 6.4–8.2)
SODIUM SERPL-SCNC: 144 MMOL/L (ref 136–145)
TRIGL SERPL-MCNC: 122 MG/DL (ref 0–149)
VLDL: 24 MG/DL (ref 0–40)

## 2024-02-27 PROCEDURE — 36415 COLL VENOUS BLD VENIPUNCTURE: CPT

## 2024-02-27 PROCEDURE — 80053 COMPREHEN METABOLIC PANEL: CPT

## 2024-02-27 PROCEDURE — 80061 LIPID PANEL: CPT

## 2024-03-06 ENCOUNTER — OFFICE VISIT (OUTPATIENT)
Dept: PRIMARY CARE | Facility: CLINIC | Age: 59
End: 2024-03-06
Payer: COMMERCIAL

## 2024-03-06 VITALS — SYSTOLIC BLOOD PRESSURE: 110 MMHG | DIASTOLIC BLOOD PRESSURE: 60 MMHG | BODY MASS INDEX: 29.83 KG/M2 | WEIGHT: 202 LBS

## 2024-03-06 DIAGNOSIS — E78.49 OTHER HYPERLIPIDEMIA: Primary | ICD-10-CM

## 2024-03-06 DIAGNOSIS — G47.33 OSA (OBSTRUCTIVE SLEEP APNEA): ICD-10-CM

## 2024-03-06 DIAGNOSIS — F41.8 DEPRESSION WITH ANXIETY: ICD-10-CM

## 2024-03-06 PROCEDURE — 1036F TOBACCO NON-USER: CPT | Performed by: INTERNAL MEDICINE

## 2024-03-06 PROCEDURE — 99214 OFFICE O/P EST MOD 30 MIN: CPT | Performed by: INTERNAL MEDICINE

## 2024-03-06 RX ORDER — ATORVASTATIN CALCIUM 80 MG/1
80 TABLET, FILM COATED ORAL DAILY
Qty: 90 TABLET | Refills: 3 | Status: SHIPPED | OUTPATIENT
Start: 2024-03-06 | End: 2025-03-06

## 2024-03-06 NOTE — PROGRESS NOTES
Subjective   Patient ID: Garfield Blankenship is a 58 y.o. male who presents for Follow-up.    HPI 58-year-old male here for follow-up hyperlipidemia he saw cardiology goal is to have his LDL around 70 we are currently at goal his CMP was good    Review of Systems  Patient complains of some slight tremor in his left hand this is recent he denies any other tremor or any other muscle weakness or stiffness.  He does take a combination of bupropion Abilify and sertraline.  He has a follow-up with psych this Saturday via virtual.  He states his mental health is doing great  Patient has poor compliance with his sleep apnea treatment he just cannot wear it has tried numerous masks his last visit with sleep apnea specialist was a year and a half ago cardiology recommends using it 4 hours per night  Objective   There were no vitals taken for this visit.    Physical Exam  No acute distress  Heart regular rate and rhythm without gallop rub or murmur  Lungs clear to auscultation percussion  Abdomen negative  Neuro awake alert and oriented x 3 mental status normal slight tremor left hand no cogwheel rigidity no masklike facies gait normal    Assessment/Plan   Diagnoses and all orders for this visit:  Other hyperlipidemia  -     atorvastatin (Lipitor) 80 mg tablet; Take 1 tablet (80 mg) by mouth once daily.  Depression with anxiety  Good control  Check with psychiatry your meds may be causing you to have slight tremor in your left hand this is called extraparametal side effects in the differential diagnosis heart senile tremor and Parkinson's will follow  KINA (obstructive sleep apnea)  Please reach out to sleep medicine possibly for a visit there alternate therapy for example inspire you see you did not qualify other options or dental appliances  Follow-up with me in December for your annual physical sooner if needed

## 2024-03-09 ENCOUNTER — TELEMEDICINE (OUTPATIENT)
Dept: BEHAVIORAL HEALTH | Facility: CLINIC | Age: 59
End: 2024-03-09
Payer: COMMERCIAL

## 2024-03-09 DIAGNOSIS — F33.42 RECURRENT MAJOR DEPRESSIVE DISORDER, IN FULL REMISSION (CMS-HCC): ICD-10-CM

## 2024-03-09 PROCEDURE — 99214 OFFICE O/P EST MOD 30 MIN: CPT

## 2024-03-09 PROCEDURE — 1036F TOBACCO NON-USER: CPT

## 2024-03-09 NOTE — PROGRESS NOTES
"Subjective   Patient ID: Garfield Blankenship is a 58 y.o. male  with past history of allergic rhinitis, anxiety, depression, vitamin D deficiency, and KINA with poor CPAP compliance. Patient presents for follow up evaluation of depression and anxiety      \"Chief Complaint -  Follow up for depression and anxiety      Patient provided 2 personal identifiers prior to virtual Teleconferencing appointment.     Patient reports that he is doing well and his medications are \"doing what they are intended to do.\"     Work hasn't been too busy and he does better when he has more to do at work so that he can keep his mind off of his worries.     Patient is satisfied with his current medications. He stopped taking Abilify due to left arm tremors.     All is well with the family. His son will be getting  in October and he looks forward to the occasion. His son is living in [place].      Patient reports that he is sleeping as well as he ever has with 6-7 hours total most nights.     Patients appetite is good.     Overall feels that things are \"Status quo.\"     No new physical complaints or new medical diagnosis.     Will need to make a follow up in 3 months.     Depressive Symptoms: not depressed or irritable mood, ~no loss of interest,~no change in appetite,~no recent lb weight gain,~no recent lb weight loss,~no insomnia,~no fatigue or loss of energy,~not feeling worthless or guilty,~normal concentration,~ability to make decisions,~no suicidal ideation,~no guns or weapons in household.   Manic Symptoms: mood is not irritable or elevated,~self esteem is not grandiose or increased,~no changes in need for sleep,~not more talkative than usual,~does not have flight of ideas or racing thoughts,~no distractibility,~no psychomotor agitation or increased goal-directed activity,~no excessive involvement in pleasurable activities.   Psychotic Symptoms: no hallucinations,~no delusions,~no disorganized speech,~does not have disorganized " behavior or catatonia,~no negative symptoms.   Anxiety Symptoms: mild  worry,~but no difficulty controlling worry, no panic attacks,~no concerns about future panic attacks,~no worry about panic attack consequences,~no change in behavior due to panic attacks,~no increase in arousal,~not easily fatigued due to worry,~no difficulty concentrating due to worry,~no irritability due to worry,~no muscle tension due to worry,~no sleep disturbances due to worry,~no specific phobia,~no social phobia,~no obsessions,~no compulsions,~no exposure to traumatic event,~no re-experiencing of traumatic event,~no avoidance of stimuli and number of responsiveness,~no restlessness / feeling on edge due to worry.   Delirium/ Altered Mental Status Symptoms: no disturbances of consciousness,~no diminished ability to focus, sustain, shift attention,~no change in cognition or perceptual disturbances,~symptoms do not fluctuate during the course of the day,~general medical condition is not present.   Disordered Eating Symptoms: weight is not less than 85% of ideal body weight.   Post-traumatic stress disorder symptoms The patient is currently asymptomatic.   innattentive Symptoms: does not make careless mistakes often,~does not have difficulty paying attention,~not often disorganized,~does not lose things often,~is not easily distracted,~is not often forgetful,~does not avoid/dislike tasks with sustained mental effort,~listens when spoken to directly,~is able to follow instructions and finish schoolwork.   All other systems have been reviewed and are negative for complaint.      Constitutional: sleep apnea, but~normal sleeping,~no night wakings,~no snoring,~not a picky eater,~normal appetite,~no swallowing problems,~no night terrors,~no nightmares,~no restless sleep,~no snorts/gasps~and~no obesity.   Eyes: wears glasses/contacts.   ENT: no hearing loss.   Cardiovascular: no chest pain,~no palpitations~and~no syncope.   Respiratory: no  "wheezing~and~no asthma/reactive airway disease.   Gastrointestinal: no constipation,~no abdominal pain,~no nausea,~no vomiting,~no diarrhea,~no blood in stools~and~no reflux.   Genitourinary: no nocturnal enuresis~and~no incontinence.   Musculoskeletal: moving all extremities well and symmetrical,~no arthritis or joint problems,~no myalgias~and~no muscle weakness.   Neurological: no headache,~no head injury,~no seizures,~no staring spells~and~no loss of consciousness.   Endocrine: no temperature intolerance~and~good growth.   Hematologic/Lymphatic: no anemia~and~no lead poisoning.   All other systems have been reviewed and are negative for complaint.     Mental Status Exam     General: In no apparent distress, well developed, appears well nourished.   Appearance: casually dressed, well groomed, appears stated age.   Attitude: Calm, cooperative   Behavior: attentive, engaged, good eye contact.   Motor Activity: Gait not assessed - virtual,.   Speech: Regular rate, rhythm, volume and tone, spontaneous, fluent.   Mood: \"Good\".   Affect: Euthymic .   Thought Process: Organized, linear, goal directed. Associations are logical.   Thought Content: Does not endorse suicidal or homicidal ideation, no delusions elicited.   Thought Perception: Does not endorse auditory or visual hallucinations, does not appear to be responding to hallucinatory stimuli.   Cognition: Alert, oriented x3. No deficits noted. Adequate fund of knowledge. No deficit in recent and remote memory. No deficits in attention, concentration or language.    Insight: Good, as patient recognizes symptoms of illness and need for recommended treatments.   Judgment: Can make reasonable decisions about ordinary activities of daily living and necessary medical care recommendations.     Lab Review:   Lab on 02/27/2024   Component Date Value    Cholesterol 02/27/2024 135     HDL-Cholesterol 02/27/2024 32.1     Cholesterol/HDL Ratio 02/27/2024 4.2     LDL Calculated " 02/27/2024 79     VLDL 02/27/2024 24     Triglycerides 02/27/2024 122     Non HDL Cholesterol 02/27/2024 103     Glucose 02/27/2024 86     Sodium 02/27/2024 144     Potassium 02/27/2024 4.6     Chloride 02/27/2024 105     Bicarbonate 02/27/2024 34 (H)     Anion Gap 02/27/2024 10     Urea Nitrogen 02/27/2024 15     Creatinine 02/27/2024 1.00     eGFR 02/27/2024 86     Calcium 02/27/2024 10.4     Albumin 02/27/2024 5.0     Alkaline Phosphatase 02/27/2024 67     Total Protein 02/27/2024 7.4     AST 02/27/2024 19     Bilirubin, Total 02/27/2024 0.6     ALT 02/27/2024 22    Office Visit on 01/22/2024   Component Date Value    Ventricular Rate 01/22/2024 84     Atrial Rate 01/22/2024 84     TN Interval 01/22/2024 182     QRS Duration 01/22/2024 106     QT Interval 01/22/2024 366     QTC Calculation(Bazett) 01/22/2024 432     P Axis 01/22/2024 56     R Axis 01/22/2024 -40     T Axis 01/22/2024 23     QRS Count 01/22/2024 13     Q Onset 01/22/2024 221     P Onset 01/22/2024 130     P Offset 01/22/2024 187     T Offset 01/22/2024 404     QTC Fredericia 01/22/2024 409          Assessment/Plan   Safety Assessment: no current or past SI, no SA, no guns. RF include age, male gender, long history of depression. PF include , connected family, limited substance use history future focused, hopeful. low risk    Diagnoses and all orders for this visit: Patient is doing well and he is satisfied with his current medications. Abilify was discontinued due to left arm tremors and he hasn't notice ill effects from stopping the medication.  Mood is good and he denies SI/HI. Daily worries but he isn't have trouble controlling them. He hasn't experienced panic attacks. Will continue current treatment plan and follow up in 3 months.     Greater than 50% of today's appointment consisted of supportive therapy, counseling, psych-education, and care coordination .   Recurrent major depressive disorder, in full remission (CMS/HCC)  CONTINUE :  450 mg Wellbutrin daily for depression  CONTINUE: 250 mg Zoloft daily for depression and anxiety       Follow up in 3 months    Time     2 minutes chart review  25 minute direct patient contact  5 minutes charting     Total Time 32 minutes

## 2024-03-11 ENCOUNTER — APPOINTMENT (OUTPATIENT)
Dept: BEHAVIORAL HEALTH | Facility: CLINIC | Age: 59
End: 2024-03-11
Payer: COMMERCIAL

## 2024-03-11 NOTE — PATIENT INSTRUCTIONS
1. Reviewed diagnostic impression including subjective and objective data and provided education about depression and anxiety disorder, etiology, treatment recommendations including medication, therapy, course of treatment and prognosis. Patient amendable to treatment plan.     2. Recommendations - We can officially stop Abilify but continue taking your other medications and let me know if you experience a return of symptoms     CONTINUE : 450 mg Wellbutrin daily for depression  CONTINUE: 250 mg Zoloft daily for depression and anxiety     Maintain exercise and consider light box therapy for the winter months.      Reviewed r/b/a, possible side effects of the medication(s). Client is aware benefit/risks      4. Labs reviewed and discussed     5. Follow up with physical health providers as scheduled     6.. Follow up in 3 months      May follow up sooner if experiences worsening symptoms by calling  Psychiatry at [phone]      Patient verbalized an understanding to call [facility] at [phone] ([facility]), 211, or 447/go to the nearest emergency room if experiences thoughts of harm to self or others.

## 2024-04-15 ENCOUNTER — HOSPITAL ENCOUNTER (OUTPATIENT)
Dept: GASTROENTEROLOGY | Facility: HOSPITAL | Age: 59
Setting detail: OUTPATIENT SURGERY
Discharge: HOME | End: 2024-04-15
Payer: COMMERCIAL

## 2024-04-15 VITALS
DIASTOLIC BLOOD PRESSURE: 64 MMHG | SYSTOLIC BLOOD PRESSURE: 111 MMHG | RESPIRATION RATE: 15 BRPM | WEIGHT: 194 LBS | BODY MASS INDEX: 27.77 KG/M2 | TEMPERATURE: 97.5 F | HEART RATE: 66 BPM | OXYGEN SATURATION: 94 % | HEIGHT: 70 IN

## 2024-04-15 DIAGNOSIS — Z12.11 COLON CANCER SCREENING: ICD-10-CM

## 2024-04-15 DIAGNOSIS — Z00.00 HEALTH MAINTENANCE EXAMINATION: Primary | ICD-10-CM

## 2024-04-15 PROCEDURE — 3700000012 HC SEDATION LEVEL 5+ TIME - INITIAL 15 MINUTES 5/> YEARS

## 2024-04-15 PROCEDURE — 2500000004 HC RX 250 GENERAL PHARMACY W/ HCPCS (ALT 636 FOR OP/ED): Performed by: STUDENT IN AN ORGANIZED HEALTH CARE EDUCATION/TRAINING PROGRAM

## 2024-04-15 PROCEDURE — 88305 TISSUE EXAM BY PATHOLOGIST: CPT | Performed by: STUDENT IN AN ORGANIZED HEALTH CARE EDUCATION/TRAINING PROGRAM

## 2024-04-15 PROCEDURE — 7100000009 HC PHASE TWO TIME - INITIAL BASE CHARGE

## 2024-04-15 PROCEDURE — 7100000010 HC PHASE TWO TIME - EACH INCREMENTAL 1 MINUTE

## 2024-04-15 PROCEDURE — 45385 COLONOSCOPY W/LESION REMOVAL: CPT | Performed by: STUDENT IN AN ORGANIZED HEALTH CARE EDUCATION/TRAINING PROGRAM

## 2024-04-15 PROCEDURE — 3700000013 HC SEDATION LEVEL 5+ TIME - EACH ADDITIONAL 15 MINUTES

## 2024-04-15 PROCEDURE — 88305 TISSUE EXAM BY PATHOLOGIST: CPT | Mod: TC,AHULAB | Performed by: STUDENT IN AN ORGANIZED HEALTH CARE EDUCATION/TRAINING PROGRAM

## 2024-04-15 RX ORDER — MIDAZOLAM HYDROCHLORIDE 1 MG/ML
INJECTION, SOLUTION INTRAMUSCULAR; INTRAVENOUS AS NEEDED
Status: COMPLETED | OUTPATIENT
Start: 2024-04-15 | End: 2024-04-15

## 2024-04-15 RX ORDER — SODIUM CHLORIDE, SODIUM LACTATE, POTASSIUM CHLORIDE, CALCIUM CHLORIDE 600; 310; 30; 20 MG/100ML; MG/100ML; MG/100ML; MG/100ML
20 INJECTION, SOLUTION INTRAVENOUS CONTINUOUS
Status: CANCELLED | OUTPATIENT
Start: 2024-04-15

## 2024-04-15 RX ORDER — FENTANYL CITRATE 50 UG/ML
INJECTION, SOLUTION INTRAMUSCULAR; INTRAVENOUS AS NEEDED
Status: COMPLETED | OUTPATIENT
Start: 2024-04-15 | End: 2024-04-15

## 2024-04-15 RX ADMIN — MIDAZOLAM HYDROCHLORIDE 1 MG: 1 INJECTION INTRAMUSCULAR; INTRAVENOUS at 08:37

## 2024-04-15 RX ADMIN — MIDAZOLAM HYDROCHLORIDE 2 MG: 1 INJECTION INTRAMUSCULAR; INTRAVENOUS at 08:32

## 2024-04-15 RX ADMIN — FENTANYL CITRATE 25 MCG: 50 INJECTION, SOLUTION INTRAMUSCULAR; INTRAVENOUS at 08:43

## 2024-04-15 RX ADMIN — FENTANYL CITRATE 25 MCG: 50 INJECTION, SOLUTION INTRAMUSCULAR; INTRAVENOUS at 08:37

## 2024-04-15 RX ADMIN — FENTANYL CITRATE 50 MCG: 50 INJECTION, SOLUTION INTRAMUSCULAR; INTRAVENOUS at 08:32

## 2024-04-15 ASSESSMENT — COLUMBIA-SUICIDE SEVERITY RATING SCALE - C-SSRS
6. HAVE YOU EVER DONE ANYTHING, STARTED TO DO ANYTHING, OR PREPARED TO DO ANYTHING TO END YOUR LIFE?: NO
1. IN THE PAST MONTH, HAVE YOU WISHED YOU WERE DEAD OR WISHED YOU COULD GO TO SLEEP AND NOT WAKE UP?: NO
2. HAVE YOU ACTUALLY HAD ANY THOUGHTS OF KILLING YOURSELF?: NO

## 2024-04-15 ASSESSMENT — PAIN - FUNCTIONAL ASSESSMENT
PAIN_FUNCTIONAL_ASSESSMENT: 0-10

## 2024-04-15 ASSESSMENT — PAIN SCALES - GENERAL

## 2024-04-15 NOTE — H&P
History Of Present Illness  Garfield Blankenship is a 58 y.o. male presenting for colonoscopy for colon polyp surveillance. Hx of SSA and TA in 2014 and TA in 2019.      Past Medical History  Past Medical History:   Diagnosis Date    Hyperlipidemia, unspecified 12/19/2022    Hyperlipidemia    Personal history of other mental and behavioral disorders 01/29/2014    History of depression    Sleep apnea     Snoring 04/06/2016    Snoring    Superficial foreign body of unspecified finger, initial encounter 08/21/2020    Foreign body of finger with infection, initial encounter    Superficial foreign body of unspecified finger, initial encounter 08/31/2020    Splinter of finger     Surgical History  Past Surgical History:   Procedure Laterality Date    OTHER SURGICAL HISTORY  01/22/2018    Primary Repair Of Ruptured Achilles Tendon     Social History  He reports that he has never smoked. He has never used smokeless tobacco. He reports current alcohol use of about 2.0 standard drinks of alcohol per week. He reports that he does not use drugs.    Family History  Family History   Problem Relation Name Age of Onset    Breast cancer Mother      Heart attack Father          Allergies  No Known Allergies  Review of Systems  Constitutional/ General: No fever  Eyes: no yellow discoloration  ENT: normal   Cardiovascular: no chest pain, no palpitations  Respiratory: no shortness of breath  Gastrointestinal: denies abdominal pain, nausea, vomiting  Integumentary: no rashes   Neurologic: no weakness or numbness of extremities  Psychiatric: no mood fluctuations  Musculoskeletal: no joint swelling   Genitourinary: no dysuria, no hematuria    All other systems have been reviewed and are negative except as noted in the HPI and above.    Pre-sedation Evaluation:  ASA Classification - ASA 2 - Patient with mild systemic disease with no functional limitations  Mallampati Score - II (hard and soft palate, upper portion of tonsils anduvula  "visible)    Physical Exam  Constitutional: awake, alert, in no acute distress  Eyes: EOMI   ENT: no oropharyngeal lesions visualized  Respiratory: Bilateral air entry equal   Cardiovascular: regular rate and rhythm, no lower extremity edema  Abdomen: soft, non tender, non distended   Integumentary: no wounds on examined skin   Musculoskeletal: no joint swelling   Neurologic: gross motor strength intact   Psychiatric: alert, appropriate mood and affect, oriented to time/place/person      Last Recorded Vitals  Blood pressure 129/73, pulse 81, temperature 36.5 °C (97.7 °F), temperature source Temporal, resp. rate 15, height 1.778 m (5' 10\"), weight 88 kg (194 lb 0.1 oz), SpO2 95%.     Assessment/Plan   Plan for colonoscopy today. Will review procedure details, potential risks and obtain informed consent.        PTA/Current Medications:  (Not in a hospital admission)    Current Outpatient Medications   Medication Sig Dispense Refill    atorvastatin (Lipitor) 80 mg tablet Take 1 tablet (80 mg) by mouth once daily. 90 tablet 3    buPROPion SR (Wellbutrin SR) 150 mg 12 hr tablet TAKE THREE (3) TABLETS BY MOUTH DAILY FOR DEPRESSION      sertraline (Zoloft) 100 mg tablet Take 2.5 tablets (250 mg) by mouth once daily. take two and a half (2-1/2) tablets daily       No current facility-administered medications for this encounter.     Carla Fernandes MD  "

## 2024-04-18 LAB
LABORATORY COMMENT REPORT: NORMAL
PATH REPORT.FINAL DX SPEC: NORMAL
PATH REPORT.GROSS SPEC: NORMAL
PATH REPORT.TOTAL CANCER: NORMAL

## 2024-04-29 DIAGNOSIS — F33.9 RECURRENT DEPRESSION (CMS-HCC): ICD-10-CM

## 2024-04-29 RX ORDER — SERTRALINE HYDROCHLORIDE 100 MG/1
250 TABLET, FILM COATED ORAL DAILY
Qty: 270 TABLET | Refills: 0 | Status: SHIPPED | OUTPATIENT
Start: 2024-04-29 | End: 2024-06-08 | Stop reason: SDUPTHER

## 2024-06-04 DIAGNOSIS — F33.41 RECURRENT MAJOR DEPRESSIVE DISORDER, IN PARTIAL REMISSION (CMS-HCC): ICD-10-CM

## 2024-06-05 RX ORDER — BUPROPION HYDROCHLORIDE 150 MG/1
TABLET, EXTENDED RELEASE ORAL
Qty: 270 TABLET | Refills: 0 | Status: SHIPPED | OUTPATIENT
Start: 2024-06-05 | End: 2024-06-08 | Stop reason: SDUPTHER

## 2024-06-08 ENCOUNTER — TELEMEDICINE (OUTPATIENT)
Dept: BEHAVIORAL HEALTH | Facility: CLINIC | Age: 59
End: 2024-06-08
Payer: COMMERCIAL

## 2024-06-08 DIAGNOSIS — F33.9 RECURRENT DEPRESSION (CMS-HCC): ICD-10-CM

## 2024-06-08 DIAGNOSIS — F33.41 RECURRENT MAJOR DEPRESSIVE DISORDER, IN PARTIAL REMISSION (CMS-HCC): ICD-10-CM

## 2024-06-08 PROCEDURE — 99214 OFFICE O/P EST MOD 30 MIN: CPT

## 2024-06-08 PROCEDURE — 1036F TOBACCO NON-USER: CPT

## 2024-06-08 RX ORDER — SERTRALINE HYDROCHLORIDE 100 MG/1
250 TABLET, FILM COATED ORAL DAILY
Qty: 270 TABLET | Refills: 3 | Status: SHIPPED | OUTPATIENT
Start: 2024-06-08

## 2024-06-08 RX ORDER — BUPROPION HYDROCHLORIDE 150 MG/1
150 TABLET, EXTENDED RELEASE ORAL DAILY
Qty: 270 TABLET | Refills: 3 | Status: SHIPPED | OUTPATIENT
Start: 2024-06-08

## 2024-06-08 NOTE — PATIENT INSTRUCTIONS
1. Reviewed diagnostic impression including subjective and objective data and provided education about depression and anxiety disorder, etiology, treatment recommendations including medication, therapy, course of treatment and prognosis. Patient amendable to treatment plan.     2. Recommendations - You look great, continue current medications and enjoy your summer. See you in the fall.     CONTINUE : 450 mg Wellbutrin daily for depression  CONTINUE: 250 mg Zoloft daily for depression and anxiety     Maintain exercise and consider light box therapy for the winter months.      Reviewed r/b/a, possible side effects of the medication(s). Client is aware benefit/risks      4. Labs reviewed and discussed     5. Follow up with physical health providers as scheduled     6.. Follow up in 3 months 8/31/2024      May follow up sooner if experiences worsening symptoms by calling  Psychiatry at [phone]      Patient verbalized an understanding to call [facility] at [phone] ([facility]), [phone], or [phone]/go to the nearest emergency room if experiences thoughts of harm to self or others.

## 2024-06-08 NOTE — PROGRESS NOTES
"Subjective   Patient ID: Garfield Blankenship is a 58 y.o. male  with past history of allergic rhinitis, anxiety, depression, vitamin D deficiency, and KINA with poor CPAP compliance. Patient presents for follow up evaluation of depression and anxiety      \"Chief Complaint -  Follow up for depression and anxiety      Patient provided 2 personal identifiers prior to virtual Teleconferencing appointment.     Patient reports that he is doing well describing that the \"Medications are doing what they are supposed to be doing.\"     Sleeping well     Appetite is good.     Keeping active, working today but participating in activities.     Going to [place] to have a get together with his 3 brothers.     No new medical diagnoses or physical symptoms for concern     Depressive Symptoms: not depressed or irritable mood, ~no loss of interest,~no change in appetite,~no recent lb weight gain,~no recent lb weight loss,~no insomnia,~no fatigue or loss of energy,~not feeling worthless or guilty,~normal concentration,~ability to make decisions,~no suicidal ideation,~no guns or weapons in household.   Manic Symptoms: mood is not irritable or elevated,~self esteem is not grandiose or increased,~no changes in need for sleep,~not more talkative than usual,~does not have flight of ideas or racing thoughts,~no distractibility,~no psychomotor agitation or increased goal-directed activity,~no excessive involvement in pleasurable activities.   Psychotic Symptoms: no hallucinations,~no delusions,~no disorganized speech,~does not have disorganized behavior or catatonia,~no negative symptoms.   Anxiety Symptoms: mild  worry,~but no difficulty controlling worry, no panic attacks,~no concerns about future panic attacks,~no worry about panic attack consequences,~no change in behavior due to panic attacks,~no increase in arousal,~not easily fatigued due to worry,~no difficulty concentrating due to worry,~no irritability due to worry,~no muscle tension " due to worry,~no sleep disturbances due to worry,~no specific phobia,~no social phobia,~no obsessions,~no compulsions,~no exposure to traumatic event,~no re-experiencing of traumatic event,~no avoidance of stimuli and number of responsiveness,~no restlessness / feeling on edge due to worry.   Delirium/ Altered Mental Status Symptoms: no disturbances of consciousness,~no diminished ability to focus, sustain, shift attention,~no change in cognition or perceptual disturbances,~symptoms do not fluctuate during the course of the day,~general medical condition is not present.   Disordered Eating Symptoms: weight is not less than 85% of ideal body weight.   Post-traumatic stress disorder symptoms The patient is currently asymptomatic.   innattentive Symptoms: does not make careless mistakes often,~does not have difficulty paying attention,~not often disorganized,~does not lose things often,~is not easily distracted,~is not often forgetful,~does not avoid/dislike tasks with sustained mental effort,~listens when spoken to directly,~is able to follow instructions and finish schoolwork.   All other systems have been reviewed and are negative for complaint.      Constitutional: sleep apnea, but~normal sleeping,~no night wakings,~no snoring,~not a picky eater,~normal appetite,~no swallowing problems,~no night terrors,~no nightmares,~no restless sleep,~no snorts/gasps~and~no obesity.   Eyes: wears glasses/contacts.   ENT: no hearing loss.   Cardiovascular: no chest pain,~no palpitations~and~no syncope.   Respiratory: no wheezing~and~no asthma/reactive airway disease.   Gastrointestinal: no constipation,~no abdominal pain,~no nausea,~no vomiting,~no diarrhea,~no blood in stools~and~no reflux.   Genitourinary: no nocturnal enuresis~and~no incontinence.   Musculoskeletal: moving all extremities well and symmetrical,~no arthritis or joint problems,~no myalgias~and~no muscle weakness.   Neurological: no headache,~no head injury,~no  "seizures,~no staring spells~and~no loss of consciousness.   Endocrine: no temperature intolerance~and~good growth.   Hematologic/Lymphatic: no anemia~and~no lead poisoning.   All other systems have been reviewed and are negative for complaint.     Mental Status Exam     General: In no apparent distress, well developed, appears well nourished.   Appearance: casually dressed, well groomed, appears stated age.   Attitude: Calm, cooperative   Behavior: attentive, engaged, good eye contact.   Motor Activity: Gait not assessed - virtual,.   Speech: Regular rate, rhythm, volume and tone, spontaneous, fluent.   Mood: \"not bad\".   Affect: Euthymic .   Thought Process: Organized, linear, goal directed. Associations are logical.   Thought Content: Does not endorse suicidal or homicidal ideation, no delusions elicited.   Thought Perception: Does not endorse auditory or visual hallucinations, does not appear to be responding to hallucinatory stimuli.   Cognition: Alert, oriented x3. No deficits noted. Adequate fund of knowledge. No deficit in recent and remote memory. No deficits in attention, concentration or language.    Insight: Good, as patient recognizes symptoms of illness and need for recommended treatments.   Judgment: Can make reasonable decisions about ordinary activities of daily living and necessary medical care recommendations.     Lab Review:   Hospital Outpatient Visit on 04/15/2024   Component Date Value    Case Report 04/15/2024                      Value:Surgical Pathology                                Case: [id number]                                  Authorizing Provider:  Carla Fernandes MD          Collected:           04/15/2024 0851              [facility]    Received:            04/15/2024 1035              Pathologist:           Angeles Holland MD                                                            Specimens:   A) - COLON -CECUM POLYP, COLD SNARE                  "                                                B) - COLON - ASCENDING POLYP, COLD SNARE X 3                                               FINAL DIAGNOSIS 04/15/2024                      Value:This result contains rich text formatting which cannot be displayed here.      04/15/2024                      Value:This result contains rich text formatting which cannot be displayed here.    Gross Description 04/15/2024                      Value:This result contains rich text formatting which cannot be displayed here.         Assessment/Plan   Safety Assessment: no current or past SI, no SA, no guns. RF include age, male gender, long history of depression. PF include , connected family, limited substance use history future focused, hopeful. low risk    Diagnoses and all orders for this visit: Patient describes doing well with no mood or anxiety concerns. He feels that his medications are effective and he would like to continue his current medications. Follow up in 3 months and monitor symptoms.     Greater than 50% of today's appointment consisted of supportive therapy, counseling, psych-education, and care coordination .   Recurrent major depressive disorder, in full remission (CMS/[facility])  CONTINUE : 450 mg Wellbutrin daily for depression  CONTINUE: 250 mg Zoloft daily for depression and anxiety       Follow up in 3 months 8/31 at 930     Time     2 minutes chart review  25 minute direct patient contact  5 minutes charting     Total Time 32 minutes

## 2024-08-30 ENCOUNTER — APPOINTMENT (OUTPATIENT)
Dept: DERMATOLOGY | Facility: CLINIC | Age: 59
End: 2024-08-30
Payer: COMMERCIAL

## 2024-08-30 DIAGNOSIS — D22.9 BENIGN NEVUS: ICD-10-CM

## 2024-08-30 DIAGNOSIS — L81.4 LENTIGO: ICD-10-CM

## 2024-08-30 DIAGNOSIS — D18.01 ANGIOMA OF SKIN: Primary | ICD-10-CM

## 2024-08-30 DIAGNOSIS — L57.9 SKIN CHANGES DUE TO CHRONIC EXPOSURE TO NONIONIZING RADIATION: ICD-10-CM

## 2024-08-30 DIAGNOSIS — L82.1 SEBORRHEIC KERATOSIS: ICD-10-CM

## 2024-08-30 PROCEDURE — 1036F TOBACCO NON-USER: CPT | Performed by: NURSE PRACTITIONER

## 2024-08-30 PROCEDURE — 99203 OFFICE O/P NEW LOW 30 MIN: CPT | Performed by: NURSE PRACTITIONER

## 2024-08-30 NOTE — PATIENT INSTRUCTIONS

## 2024-08-30 NOTE — PROGRESS NOTES
Subjective     Garfield Blankenship is a 59 y.o. male who presents for the following: Skin Check.     Review of Systems:  No other skin or systemic complaints other than what is documented elsewhere in the note.    The following portions of the chart were reviewed this encounter and updated as appropriate:   Tobacco  Allergies  Meds  Problems  Med Hx  Surg Hx         Skin Cancer History  No skin cancer on file.      Specialty Problems          Dermatology Problems    Follicular disorder, unspecified    Hemangioma of skin and subcutaneous tissue    Melanocytic nevi of trunk    Melanocytic nevi of unspecified lower limb, including hip    Melanocytic nevi of unspecified part of face    Melanocytic nevi of unspecified upper limb, including shoulder    Nonscarring hair loss, unspecified    Other seborrheic keratosis    Rhytidosis facialis        Objective   Well appearing patient in no apparent distress; mood and affect are within normal limits.    A full examination was performed including scalp, head, eyes, ears, nose, lips, neck, chest, axillae, abdomen, back, buttocks, bilateral upper extremities, bilateral lower extremities, hands, feet, fingers, toes, fingernails, and toenails. All findings within normal limits unless otherwise noted below.      Assessment/Plan   1. Angioma of skin  Scattered cherry-red papule(s).    A cherry hemangioma is a small macule (small, flat, smooth area) or papule (small, solid bump) formed from an overgrowth of tiny blood vessels in the skin. Cherry hemangiomas are characteristically red or purplish in color. They often first appear in middle adulthood and usually increase in number with age. Cherry hemangiomas are noncancerous (benign) and are common in adults.    The present appearance of the lesion is not worrisome but it should continue to be observed and testing/treatment may be warranted if change occurs.    2. Benign nevus  Scattered, uniform and benign-appearing, regular brown  melanocytic papules and macules.    The present appearance of the lesion is not worrisome but it should continue to be observed and testing/treatment may be warranted if change occurs.    3. Seborrheic keratosis  Stuck on verrucous, tan-brown papules and plaques.      Seborrheic keratoses are common noncancerous (benign) growths of unknown cause seen in adults due to a thickening of an area of the top skin layer. Seborrheic keratoses may appear as if they are stuck on to the skin. They have distinct borders, and they may appear as papules (small, solid bumps) or plaques (solid, raised patches that are bigger than a thumbnail). They may be the same color as your skin, or they may be pink, light brown, darker brown, or very dark brown, or sometimes may appear black.    There is no way to prevent new seborrheic keratoses from forming. Seborrheic keratoses can be removed, but removal is considered a cosmetic issue and is usually not covered by insurance.    PLAN  No treatment is needed unless there is irritation from clothing, such as itching or bleeding.  2.   Some lotions containing alpha hydroxy acids, salicylic acid, or urea may make the areas feel smoother with regular use but will not eliminate them.    Numerous lesions noted to the periorbital/eyelid area. Referred patient to Dr. Marino for consultation for removal of lesions. Reassured patient regarding benign nature of lesions.     4. Lentigo  Scattered tan macules in sun-exposed areas.    A solar lentigo (plural, solar lentigines), also known as a sun-induced freckle or senile lentigo, is a dark (hyperpigmented) lesion caused by natural or artificial ultraviolet (UV) light. Solar lentigines may be single or multiple. This type of lentigo is different from a simple lentigo (lentigo simplex) because it is caused by exposure to UV light. Solar lentigines are benign, but they do indicate excessive sun exposure, a risk factor for the development of skin  cancer.    To prevent solar lentigines, avoid exposure to sunlight in midday (10 AM to 3 PM), wear sun-protective clothing (tightly woven clothes and hats), and apply sunscreen (SPF 30 UVA and UVB block).    The present appearance of the lesion is not worrisome but it should continue to be observed and testing/treatment may be warranted if change occurs.    5. Skin changes due to chronic exposure to nonionizing radiation  Actinic changes in the form of freckles, lentigines and hyper/hypopigmentation     ABCDEs of melanoma and atypical moles were discussed with the patient.    Patient was instructed to perform monthly self skin examination.  We recommended that the patient have regular full skin exams given an increased risk of subsequent skin cancers.    The patient was instructed to use sun protective behaviors including use of broad spectrum sunscreens and sun protective clothing to reduce risk of skin cancers.    Warning signs of non-melanoma skin cancer discussed.        Return to clinic in 1-2 years for skin check/follow up or sooner if needed

## 2024-08-31 ENCOUNTER — APPOINTMENT (OUTPATIENT)
Dept: BEHAVIORAL HEALTH | Facility: CLINIC | Age: 59
End: 2024-08-31
Payer: COMMERCIAL

## 2024-08-31 DIAGNOSIS — F33.0 MILD EPISODE OF RECURRENT MAJOR DEPRESSIVE DISORDER (CMS-HCC): ICD-10-CM

## 2024-08-31 PROCEDURE — 1036F TOBACCO NON-USER: CPT

## 2024-08-31 PROCEDURE — 99214 OFFICE O/P EST MOD 30 MIN: CPT

## 2024-08-31 NOTE — PATIENT INSTRUCTIONS
1. Reviewed diagnostic impression including subjective and objective data and provided education about depression and anxiety disorder, etiology, treatment recommendations including medication, therapy, course of treatment and prognosis. Patient amendable to treatment plan.     2. Recommendations - You are doing well, have fun during your son's wedding and congratulations to you     CONTINUE : 450 mg Wellbutrin daily for depression  CONTINUE: 250 mg Zoloft daily for depression and anxiety     Maintain exercise and consider light box therapy for the winter months.      Reviewed r/b/a, possible side effects of the medication(s). Client is aware benefit/risks      4. Labs reviewed and discussed     5. Follow up with physical health providers as scheduled     6.. Follow up in 3 months 12/7/2024 at 930      May follow up sooner if experiences worsening symptoms by calling  Psychiatry at [phone]      Patient verbalized an understanding to call [facility] at [phone] ([facility]), [phone], or 265/go to the nearest emergency room if experiences thoughts of harm to self or others.

## 2024-08-31 NOTE — PROGRESS NOTES
"Patient ID: Garfield Blankenship is a 59  y.o. male  with past history of allergic rhinitis, anxiety, depression, vitamin D deficiency, and KINA with poor CPAP compliance. Patient presents for follow up evaluation of depression and anxiety      \"Chief Complaint -  Follow up for depression and anxiety      Patient provided 2 personal identifiers prior to virtual Teleconferencing appointment.     Doing well  with patient describing his mood as \"pretty good.\"      Mental Health is stable - Medications are working.     Denies being depressed with no SI or HI.     No complaint of anxiety or panic attacks.     Patient is sleeping well.     Appetite is good.     No new health concerns or physical complaints.    Work keeps him busy but not overwhelmed.     Looking forward to going to his son's wedding on Oct 12 in [place].      Depressive Symptoms: not depressed or irritable mood, ~no loss of interest,~no change in appetite,~no recent lb weight gain,~no recent lb weight loss,~no insomnia,~no fatigue or loss of energy,~not feeling worthless or guilty,~normal concentration,~ability to make decisions,~no suicidal ideation,~no guns or weapons in household.   Manic Symptoms: mood is not irritable or elevated,~self esteem is not grandiose or increased,~no changes in need for sleep,~not more talkative than usual,~does not have flight of ideas or racing thoughts,~no distractibility,~no psychomotor agitation or increased goal-directed activity,~no excessive involvement in pleasurable activities.   Psychotic Symptoms: no hallucinations,~no delusions,~no disorganized speech,~does not have disorganized behavior or catatonia,~no negative symptoms.   Anxiety Symptoms: mild  worry,~but no difficulty controlling worry, no panic attacks,~no concerns about future panic attacks,~no worry about panic attack consequences,~no change in behavior due to panic attacks,~no increase in arousal,~not easily fatigued due to worry,~no difficulty concentrating " due to worry,~no irritability due to worry,~no muscle tension due to worry,~no sleep disturbances due to worry,~no specific phobia,~no social phobia,~no obsessions,~no compulsions,~no exposure to traumatic event,~no re-experiencing of traumatic event,~no avoidance of stimuli and number of responsiveness,~no restlessness / feeling on edge due to worry.   Delirium/ Altered Mental Status Symptoms: no disturbances of consciousness,~no diminished ability to focus, sustain, shift attention,~no change in cognition or perceptual disturbances,~symptoms do not fluctuate during the course of the day,~general medical condition is not present.   Disordered Eating Symptoms: weight is not less than 85% of ideal body weight.   Post-traumatic stress disorder symptoms The patient is currently asymptomatic.   innattentive Symptoms: does not make careless mistakes often,~does not have difficulty paying attention,~not often disorganized,~does not lose things often,~is not easily distracted,~is not often forgetful,~does not avoid/dislike tasks with sustained mental effort,~listens when spoken to directly,~is able to follow instructions and finish schoolwork.   All other systems have been reviewed and are negative for complaint.      Constitutional: sleep apnea, but~normal sleeping,~no night wakings,~no snoring,~not a picky eater,~normal appetite,~no swallowing problems,~no night terrors,~no nightmares,~no restless sleep,~no snorts/gasps~and~no obesity.   Eyes: wears glasses/contacts.   ENT: no hearing loss.   Cardiovascular: no chest pain,~no palpitations~and~no syncope.   Respiratory: no wheezing~and~no asthma/reactive airway disease.   Gastrointestinal: no constipation,~no abdominal pain,~no nausea,~no vomiting,~no diarrhea,~no blood in stools~and~no reflux.   Genitourinary: no nocturnal enuresis~and~no incontinence.   Musculoskeletal: moving all extremities well and symmetrical,~no arthritis or joint problems,~no myalgias~and~no  "muscle weakness.   Neurological: no headache,~no head injury,~no seizures,~no staring spells~and~no loss of consciousness.   Endocrine: no temperature intolerance~and~good growth.   Hematologic/Lymphatic: no anemia~and~no lead poisoning.   All other systems have been reviewed and are negative for complaint.      Mental Status Exam     General: In no apparent distress, well developed, appears well nourished.   Appearance: casually dressed, well groomed, appears stated age.   Attitude: Calm, cooperative   Behavior: attentive, engaged, good eye contact.   Motor Activity: Gait not assessed - virtual,.   Speech: Regular rate, rhythm, volume and tone, spontaneous, fluent.   Mood: \"pretty good\".   Affect: Appropriate  .   Thought Process: Organized, linear, goal directed. Associations are logical.   Thought Content: Does not endorse suicidal or homicidal ideation, no delusions elicited.   Thought Perception: Does not endorse auditory or visual hallucinations, does not appear to be responding to hallucinatory stimuli.   Cognition: Alert, oriented x3. No deficits noted. Adequate fund of knowledge. No deficit in recent and remote memory. No deficits in attention, concentration or language.    Insight: Good, as patient recognizes symptoms of illness and need for recommended treatments.   Judgment: Can make reasonable decisions about ordinary activities of daily living and necessary medical care recommendations.      Lab Review:          Hospital Outpatient Visit on 04/15/2024   Component Date Value     Case Report 04/15/2024                      Value:Surgical Pathology                                Case: [id number]                                  Authorizing Provider:  Carla Fernandes MD          Collected:           04/15/2024 0851              Ordering Location:     [facility]    Received:            04/15/2024 1035              Pathologist:           Angeles Holland MD                                           " "                 Specimens:   A) - COLON -CECUM POLYP, COLD SNARE                                                                 B) - COLON - ASCENDING POLYP, COLD SNARE X 3                                                FINAL DIAGNOSIS 04/15/2024                      Value:This result contains rich text formatting which cannot be displayed here.      04/15/2024                      Value:This result contains rich text formatting which cannot be displayed here.    Gross Description 04/15/2024                      Value:This result contains rich text formatting which cannot be displayed here.          Assessment/Plan   Safety Assessment: no current or past SI, no SA, no guns. RF include age, male gender, long history of depression. PF include , connected family, limited substance use history future focused, hopeful. low risk     Diagnoses and all orders for this visit:  Patient reports that he is feeling \"stable,\" and denies depression or anxiety. No SI, HI, or panic attacks. Patient is satisfied with his current medications and isn't interested in making a change.   Greater than 50% of today's appointment consisted of supportive therapy, counseling, psych-education, and care coordination .   Recurrent major depressive disorder, in full remission (CMS/[facility])  CONTINUE : 450 mg Wellbutrin daily for depression  CONTINUE: 250 mg Zoloft daily for depression and anxiety        Follow up in 3 months 12/7/2024  at 930   "

## 2024-09-06 DIAGNOSIS — F33.41 RECURRENT MAJOR DEPRESSIVE DISORDER, IN PARTIAL REMISSION (CMS-HCC): ICD-10-CM

## 2024-09-06 DIAGNOSIS — F33.9 RECURRENT DEPRESSION (CMS-HCC): ICD-10-CM

## 2024-09-06 RX ORDER — SERTRALINE HYDROCHLORIDE 100 MG/1
250 TABLET, FILM COATED ORAL DAILY
Qty: 270 TABLET | Refills: 3 | Status: SHIPPED | OUTPATIENT
Start: 2024-09-06

## 2024-09-06 RX ORDER — BUPROPION HYDROCHLORIDE 150 MG/1
150 TABLET, EXTENDED RELEASE ORAL DAILY
Qty: 270 TABLET | Refills: 3 | Status: SHIPPED | OUTPATIENT
Start: 2024-09-06

## 2024-09-10 DIAGNOSIS — F33.41 RECURRENT MAJOR DEPRESSIVE DISORDER, IN PARTIAL REMISSION (CMS-HCC): ICD-10-CM

## 2024-09-10 RX ORDER — BUPROPION HYDROCHLORIDE 150 MG/1
450 TABLET ORAL DAILY
Qty: 270 TABLET | Refills: 3 | Status: SHIPPED | OUTPATIENT
Start: 2024-09-10 | End: 2025-09-10

## 2024-09-14 DIAGNOSIS — F33.41 RECURRENT MAJOR DEPRESSIVE DISORDER, IN PARTIAL REMISSION (CMS-HCC): ICD-10-CM

## 2024-09-14 RX ORDER — BUPROPION HYDROCHLORIDE 150 MG/1
TABLET, EXTENDED RELEASE ORAL
Qty: 270 TABLET | Refills: 0 | OUTPATIENT
Start: 2024-09-14

## 2024-11-28 DIAGNOSIS — F33.9 RECURRENT DEPRESSION (CMS-HCC): ICD-10-CM

## 2024-11-28 RX ORDER — SERTRALINE HYDROCHLORIDE 100 MG/1
250 TABLET, FILM COATED ORAL DAILY
Qty: 270 TABLET | Refills: 3 | Status: SHIPPED | OUTPATIENT
Start: 2024-11-28

## 2024-12-07 ENCOUNTER — APPOINTMENT (OUTPATIENT)
Dept: BEHAVIORAL HEALTH | Facility: CLINIC | Age: 59
End: 2024-12-07
Payer: COMMERCIAL

## 2024-12-07 DIAGNOSIS — F33.42 RECURRENT MAJOR DEPRESSIVE DISORDER, IN FULL REMISSION (CMS-HCC): ICD-10-CM

## 2024-12-07 PROBLEM — E11.9 TYPE 2 DIABETES MELLITUS WITHOUT COMPLICATION, WITHOUT LONG-TERM CURRENT USE OF INSULIN (MULTI): Status: ACTIVE | Noted: 2024-12-07

## 2024-12-07 PROCEDURE — 1036F TOBACCO NON-USER: CPT

## 2024-12-07 PROCEDURE — 3048F LDL-C <100 MG/DL: CPT

## 2024-12-07 PROCEDURE — 99213 OFFICE O/P EST LOW 20 MIN: CPT

## 2024-12-07 NOTE — PROGRESS NOTES
"Patient ID: Garfield Blankenship is a 59  y.o. male  with past history of allergic rhinitis, anxiety, depression, vitamin D deficiency, and KINA with poor CPAP compliance. Patient presents for follow up evaluation of depression and anxiety      \"Chief Complaint -  Follow up for depression and anxiety      Patient provided 2 personal identifiers prior to virtual Teleconferencing appointment.     Wedding went well and he enjoyed spending time with his family.     Mood has been pretty good, stable.     Anxiety is well controlled .     Sleeping OK. Chronic Fatigue but no pain or other medical problems.     Appetite is good.     Busy with a few projects at work. Doesn't do well if he doesn't stay busy.     No mental health complaints today.           Depressive Symptoms: Ongoing fatigue, not depressed or irritable mood, ~no loss of interest,~no change in appetite,~no recent lb weight gain,~no recent lb weight loss,~no insomnia,~~not feeling worthless or guilty,~normal concentration,~ability to make decisions,~no suicidal ideation,~no guns or weapons in household.   Manic Symptoms: mood is not irritable or elevated,~self esteem is not grandiose or increased,~no changes in need for sleep,~not more talkative than usual,~does not have flight of ideas or racing thoughts,~no distractibility,~no psychomotor agitation or increased goal-directed activity,~no excessive involvement in pleasurable activities.   Psychotic Symptoms: no hallucinations,~no delusions,~no disorganized speech,~does not have disorganized behavior or catatonia,~no negative symptoms.   Anxiety Symptoms: mild  worry,~but no difficulty controlling worry, no panic attacks,~no concerns about future panic attacks,~no worry about panic attack consequences,~no change in behavior due to panic attacks,~no increase in arousal,~not easily fatigued due to worry,~no difficulty concentrating due to worry,~no irritability due to worry,~no muscle tension due to worry,~no sleep " disturbances due to worry,~no specific phobia,~no social phobia,~no obsessions,~no compulsions,~no exposure to traumatic event,~no re-experiencing of traumatic event,~no avoidance of stimuli and number of responsiveness,~no restlessness / feeling on edge due to worry.   Delirium/ Altered Mental Status Symptoms: no disturbances of consciousness,~no diminished ability to focus, sustain, shift attention,~no change in cognition or perceptual disturbances,~symptoms do not fluctuate during the course of the day,~general medical condition is not present.   Disordered Eating Symptoms: weight is not less than 85% of ideal body weight.   Post-traumatic stress disorder symptoms The patient is currently asymptomatic.   innattentive Symptoms: does not make careless mistakes often,~does not have difficulty paying attention,~not often disorganized,~does not lose things often,~is not easily distracted,~is not often forgetful,~does not avoid/dislike tasks with sustained mental effort,~listens when spoken to directly,~is able to follow instructions and finish schoolwork.   All other systems have been reviewed and are negative for complaint.      Constitutional: sleep apnea, but~normal sleeping,~no night wakings,~no snoring,~not a picky eater,~normal appetite,~no swallowing problems,~no night terrors,~no nightmares,~no restless sleep,~no snorts/gasps~and~no obesity.   Eyes: wears glasses/contacts.   ENT: no hearing loss.   Cardiovascular: no chest pain,~no palpitations~and~no syncope.   Respiratory: no wheezing~and~no asthma/reactive airway disease.   Gastrointestinal: no constipation,~no abdominal pain,~no nausea,~no vomiting,~no diarrhea,~no blood in stools~and~no reflux.   Genitourinary: no nocturnal enuresis~and~no incontinence.   Musculoskeletal: moving all extremities well and symmetrical,~no arthritis or joint problems,~no myalgias~and~no muscle weakness.   Neurological: no headache,~no head injury,~no seizures,~no staring  "spells~and~no loss of consciousness.   Endocrine: no temperature intolerance~and~good growth.   Hematologic/Lymphatic: no anemia~and~no lead poisoning.   All other systems have been reviewed and are negative for complaint.      Mental Status Exam     General: In no apparent distress, well developed, appears well nourished.   Appearance: casually dressed, well groomed, appears stated age.   Attitude: Calm, cooperative   Behavior: attentive, engaged, good eye contact.   Motor Activity: Gait not assessed - virtual,.   Speech: Regular rate, rhythm, volume and tone, spontaneous, fluent.   Mood: \"Good\".   Affect: Appropriate  .   Thought Process: Organized, linear, goal directed. Associations are logical.   Thought Content: Does not endorse suicidal or homicidal ideation, no delusions elicited.   Thought Perception: Does not endorse auditory or visual hallucinations, does not appear to be responding to hallucinatory stimuli.   Cognition: Alert, oriented x3. No deficits noted. Adequate fund of knowledge. No deficit in recent and remote memory. No deficits in attention, concentration or language.    Insight: Good, as patient recognizes symptoms of illness and need for recommended treatments.   Judgment: Can make reasonable decisions about ordinary activities of daily living and necessary medical care recommendations.      Lab Review:          Hospital Outpatient Visit on 04/15/2024   Component Date Value     Case Report 04/15/2024                      Value:Surgical Pathology                                Case: [id number]                                  Authorizing Provider:  Carla Fernandes MD          Collected:           04/15/2024 0851              Ordering Location:     [facility]    Received:            04/15/2024 1035              Pathologist:           Angeles Holland MD                                                            Specimens:   A) - COLON -CECUM POLYP, COLD SNARE                            "                                      B) - COLON - ASCENDING POLYP, COLD SNARE X 3                                                FINAL DIAGNOSIS 04/15/2024                      Value:This result contains rich text formatting which cannot be displayed here.      04/15/2024                      Value:This result contains rich text formatting which cannot be displayed here.    Gross Description 04/15/2024                      Value:This result contains rich text formatting which cannot be displayed here.          Assessment/Plan   Safety Assessment: no current or past SI, no SA, no guns. RF include age, male gender, long history of depression. PF include , connected family, limited substance use history future focused, hopeful. low risk     Diagnoses and all orders for this visit:  Patient describes ongoing fatigue but he has no mental health complaints today. He is sleeping well and has no problems with his appetite. Depression is well controlled with current medications. Discussed oral Ketamine with Brock.  No SI, HI, or panic attacks. Patient is satisfied with his current medications and isn't interested in making a change.   Greater than 50% of today's appointment consisted of supportive therapy, counseling, psych-education, and care coordination .   Recurrent major depressive disorder, in full remission (CMS/[facility])  CONTINUE : 450 mg Wellbutrin daily for depression  CONTINUE: 250 mg Zoloft daily for depression and anxiety        Follow up in 3 months March 1 at 9 am

## 2024-12-07 NOTE — PATIENT INSTRUCTIONS
1. Reviewed diagnostic impression including subjective and objective data and provided education about depression and anxiety disorder, etiology, treatment recommendations including medication, therapy, course of treatment and prognosis. Patient amendable to treatment plan.     2. Recommendations - Have a nice holiday and New Year. Continue current medications and we can follow up in early March      CONTINUE : 450 mg Wellbutrin daily for depression  CONTINUE: 250 mg Zoloft daily for depression and anxiety     Maintain exercise and consider light box therapy for the winter months.      Reviewed r/b/a, possible side effects of the medication(s). Client is aware benefit/risks      4. Labs reviewed and discussed     5. Follow up with physical health providers as scheduled     6.. Follow up in 3 months March 1 at 9 am      May follow up sooner if experiences worsening symptoms by calling  Psychiatry at [phone]      Patient verbalized an understanding to call Mobile Crisis at [phone] ([facility]), [phone], or 237/go to the nearest emergency room if experiences thoughts of harm to self or others.          79 year old male with PMH of HTN, CAD, CVA, stents, ED, presents to the ER with cc of progressively worsening SOB, ERICKSON,  hypoxia to 91%, baseline bed bound, hx of decubitus ulcer, seen outpatient had xray showed pna, given doxy but today with no relief. Pt with expectoration of phlegm.  Ct with LLL opacification.  Consulted for Bronch  10/8 FB BAL       Plan   Pt can be discharged from a thoracic standpoint   Pt can follow up with Pulm upon discharge   cont nebs, ABX as per pulm, ID  needs aggressive chest PT, does not have a strong cough  Will sign off- please reconsult if needed       Discussed with Cardiothoracic Team at AM rounds.

## 2024-12-10 ENCOUNTER — OFFICE VISIT (OUTPATIENT)
Dept: URGENT CARE | Age: 59
End: 2024-12-10
Payer: COMMERCIAL

## 2024-12-10 VITALS
DIASTOLIC BLOOD PRESSURE: 67 MMHG | RESPIRATION RATE: 16 BRPM | SYSTOLIC BLOOD PRESSURE: 152 MMHG | OXYGEN SATURATION: 95 % | BODY MASS INDEX: 27.98 KG/M2 | HEART RATE: 73 BPM | TEMPERATURE: 98.2 F | WEIGHT: 195 LBS

## 2024-12-10 DIAGNOSIS — J01.10 ACUTE NON-RECURRENT FRONTAL SINUSITIS: Primary | ICD-10-CM

## 2024-12-10 PROCEDURE — 3078F DIAST BP <80 MM HG: CPT | Performed by: NURSE PRACTITIONER

## 2024-12-10 PROCEDURE — 1036F TOBACCO NON-USER: CPT | Performed by: NURSE PRACTITIONER

## 2024-12-10 PROCEDURE — 3048F LDL-C <100 MG/DL: CPT | Performed by: NURSE PRACTITIONER

## 2024-12-10 PROCEDURE — 3077F SYST BP >= 140 MM HG: CPT | Performed by: NURSE PRACTITIONER

## 2024-12-10 PROCEDURE — 99203 OFFICE O/P NEW LOW 30 MIN: CPT | Performed by: NURSE PRACTITIONER

## 2024-12-10 RX ORDER — FLUTICASONE PROPIONATE 50 MCG
1 SPRAY, SUSPENSION (ML) NASAL DAILY
Qty: 16 G | Refills: 0 | Status: SHIPPED | OUTPATIENT
Start: 2024-12-10 | End: 2025-12-10

## 2024-12-10 RX ORDER — AMOXICILLIN AND CLAVULANATE POTASSIUM 875; 125 MG/1; MG/1
875 TABLET, FILM COATED ORAL 2 TIMES DAILY
Qty: 10 TABLET | Refills: 0 | Status: SHIPPED | OUTPATIENT
Start: 2024-12-10 | End: 2024-12-15

## 2024-12-10 ASSESSMENT — ENCOUNTER SYMPTOMS
SINUS COMPLAINT: 1
SINUS PRESSURE: 1
FATIGUE: 1

## 2024-12-10 NOTE — PROGRESS NOTES
Subjective   Patient ID: Garfield Blankenship is a 59 y.o. male. They present today with a chief complaint of Sinus Problem and Fatigue (For 6 days). Presenting for frontal sinus congestion x 6 days, fatigue, and ear fullness. He denies any fever or chills, denies any sore throat     History of Present Illness    Sinus Problem  Associated symptoms: congestion and fatigue    Fatigue  Associated symptoms: congestion and fatigue        Past Medical History  Allergies as of 12/10/2024    (No Known Allergies)       (Not in a hospital admission)       Past Medical History:   Diagnosis Date    Hyperlipidemia, unspecified 12/19/2022    Hyperlipidemia    Personal history of other mental and behavioral disorders 01/29/2014    History of depression    Sleep apnea     Snoring 04/06/2016    Snoring    Superficial foreign body of unspecified finger, initial encounter 08/21/2020    Foreign body of finger with infection, initial encounter    Superficial foreign body of unspecified finger, initial encounter 08/31/2020    Splinter of finger       Past Surgical History:   Procedure Laterality Date    OTHER SURGICAL HISTORY  01/22/2018    Primary Repair Of Ruptured Achilles Tendon        reports that he has never smoked. He has never used smokeless tobacco. He reports current alcohol use of about 2.0 standard drinks of alcohol per week. He reports that he does not use drugs.    Review of Systems  Review of Systems   Constitutional:  Positive for fatigue.   HENT:  Positive for congestion and sinus pressure.                                   Objective    Vitals:    12/10/24 1657   BP: 152/67   BP Location: Left arm   Patient Position: Sitting   BP Cuff Size: Large adult   Pulse: 73   Resp: 16   Temp: 36.8 °C (98.2 °F)   TempSrc: Oral   SpO2: 95%   Weight: 88.5 kg (195 lb)     No LMP for male patient.    Physical Exam  Constitutional:       Appearance: Normal appearance.   HENT:      Right Ear: Tympanic membrane, ear canal and external ear  normal.      Left Ear: External ear normal. There is impacted cerumen.      Nose: Congestion present.   Cardiovascular:      Rate and Rhythm: Normal rate and regular rhythm.      Pulses: Normal pulses.      Heart sounds: Normal heart sounds. No murmur heard.     No gallop.   Pulmonary:      Effort: Pulmonary effort is normal. No respiratory distress.      Breath sounds: Normal breath sounds. No wheezing or rhonchi.   Musculoskeletal:         General: Normal range of motion.   Skin:     General: Skin is warm.      Capillary Refill: Capillary refill takes less than 2 seconds.   Neurological:      Mental Status: He is alert and oriented to person, place, and time.   Psychiatric:         Mood and Affect: Mood normal.         Thought Content: Thought content normal.         Judgment: Judgment normal.         Procedures    Point of Care Test & Imaging Results from this visit  No results found for this visit on 12/10/24.   No results found.    Diagnostic study results (if any) were reviewed by CARLOS Mobley.    Assessment/Plan   Allergies, medications, history, and pertinent labs/EKGs/Imaging reviewed by CARLOS Mobley.     Medical Decision Making    Sinusitis  -augmentin and flonase  -PRN mucinex  -Return to clinic if no improvement or worsening of symptoms after 7-10 days      Orders and Diagnoses  Diagnoses and all orders for this visit:  Acute non-recurrent frontal sinusitis  -     amoxicillin-pot clavulanate (Augmentin) 875-125 mg tablet; Take 1 tablet (875 mg) by mouth 2 times a day for 5 days.  -     fluticasone (Flonase Allergy Relief) 50 mcg/actuation nasal spray; Administer 1 spray into each nostril once daily. Shake gently. Before first use, prime pump. After use, clean tip and replace cap.      Medical Admin Record      Patient disposition: Home    Electronically signed by CARLOS Mobley  5:23 PM

## 2025-02-27 ENCOUNTER — APPOINTMENT (OUTPATIENT)
Dept: PRIMARY CARE | Facility: CLINIC | Age: 60
End: 2025-02-27
Payer: COMMERCIAL

## 2025-03-01 ENCOUNTER — APPOINTMENT (OUTPATIENT)
Dept: BEHAVIORAL HEALTH | Facility: CLINIC | Age: 60
End: 2025-03-01
Payer: COMMERCIAL

## 2025-03-01 DIAGNOSIS — F33.42 RECURRENT MAJOR DEPRESSIVE DISORDER, IN FULL REMISSION (CMS-HCC): ICD-10-CM

## 2025-03-01 PROCEDURE — 1036F TOBACCO NON-USER: CPT

## 2025-03-01 PROCEDURE — 99213 OFFICE O/P EST LOW 20 MIN: CPT

## 2025-03-01 NOTE — PROGRESS NOTES
"Patient ID: Garfield Blankenship is a 59  y.o. male  with past history of allergic rhinitis, anxiety, depression, vitamin D deficiency, and KINA with poor CPAP compliance. Patient presents for follow up evaluation of depression and anxiety      \"Chief Complaint -  Follow up for depression and anxiety      Patient provided 2 personal identifiers prior to virtual Teleconferencing appointment.     Patient has no complaints today. He describes his mood as \"good.\" He has decided to retire next Spring and is hoping his wife will do the same. Long-term goal is to relocate to [place] to be closer to 2 of their sons.     Keeping busy with work, working today in fact, but anxiety has lessened due to his decision to retire. No panic attacks.     No report of depression, SI , or HI.     Patient is sleeping well and has no appetite problems.     No report of pain    No physical complaints of new medical diagnoses.    Depressive Symptoms: Ongoing fatigue, not depressed or irritable mood, ~no loss of interest,~no change in appetite,~no recent lb weight gain,~no recent lb weight loss,~no insomnia,~~not feeling worthless or guilty,~normal concentration,~ability to make decisions,~no suicidal ideation,~no guns or weapons in household.   Manic Symptoms: mood is not irritable or elevated,~self esteem is not grandiose or increased,~no changes in need for sleep,~not more talkative than usual,~does not have flight of ideas or racing thoughts,~no distractibility,~no psychomotor agitation or increased goal-directed activity,~no excessive involvement in pleasurable activities.   Psychotic Symptoms: no hallucinations,~no delusions,~no disorganized speech,~does not have disorganized behavior or catatonia,~no negative symptoms.   Anxiety Symptoms: no worries today,  no difficulty controlling worry, no panic attacks,~no concerns about future panic attacks,~no worry about panic attack consequences,~no change in behavior due to panic attacks,~no " increase in arousal,~not easily fatigued due to worry,~no difficulty concentrating due to worry,~no irritability due to worry,~no muscle tension due to worry,~no sleep disturbances due to worry,~no specific phobia,~no social phobia,~no obsessions,~no compulsions,~no exposure to traumatic event,~no re-experiencing of traumatic event,~no avoidance of stimuli and number of responsiveness,~no restlessness / feeling on edge due to worry.   Delirium/ Altered Mental Status Symptoms: no disturbances of consciousness,~no diminished ability to focus, sustain, shift attention,~no change in cognition or perceptual disturbances,~symptoms do not fluctuate during the course of the day,~general medical condition is not present.   Disordered Eating Symptoms: weight is not less than 85% of ideal body weight.   Post-traumatic stress disorder symptoms The patient is currently asymptomatic.   innattentive Symptoms: does not make careless mistakes often,~does not have difficulty paying attention,~not often disorganized,~does not lose things often,~is not easily distracted,~is not often forgetful,~does not avoid/dislike tasks with sustained mental effort,~listens when spoken to directly,~is able to follow instructions and finish schoolwork.   All other systems have been reviewed and are negative for complaint.      Constitutional: sleep apnea, but~normal sleeping,~no night wakings,~no snoring,~not a picky eater,~normal appetite,~no swallowing problems,~no night terrors,~no nightmares,~no restless sleep,~no snorts/gasps~and~no obesity.   Eyes: wears glasses/contacts.   ENT: no hearing loss.   Cardiovascular: no chest pain,~no palpitations~and~no syncope.   Respiratory: no wheezing~and~no asthma/reactive airway disease.   Gastrointestinal: no constipation,~no abdominal pain,~no nausea,~no vomiting,~no diarrhea,~no blood in stools~and~no reflux.   Genitourinary: no nocturnal enuresis~and~no incontinence.   Musculoskeletal: moving all  "extremities well and symmetrical,~no arthritis or joint problems,~no myalgias~and~no muscle weakness.   Neurological: no headache,~no head injury,~no seizures,~no staring spells~and~no loss of consciousness.   Endocrine: no temperature intolerance~and~good growth.   Hematologic/Lymphatic: no anemia~and~no lead poisoning.   All other systems have been reviewed and are negative for complaint.      Mental Status Exam     General: In no apparent distress, well developed, appears well nourished.   Appearance: casually dressed, well groomed, appears stated age.   Attitude: Calm, cooperative   Behavior: attentive, engaged, good eye contact.   Motor Activity: Gait not assessed - virtual,.   Speech: Regular rate, rhythm, volume and tone, spontaneous, fluent.   Mood: \"Good\".   Affect: Euthymic   .   Thought Process: Organized, linear, goal directed. Associations are logical.   Thought Content: Does not endorse suicidal or homicidal ideation, no delusions elicited.   Thought Perception: Does not endorse auditory or visual hallucinations, does not appear to be responding to hallucinatory stimuli.   Cognition: Alert, oriented x3. No deficits noted. Adequate fund of knowledge. No deficit in recent and remote memory. No deficits in attention, concentration or language.    Insight: Good, as patient recognizes symptoms of illness and need for recommended treatments.   Judgment: Can make reasonable decisions about ordinary activities of daily living and necessary medical care recommendations.      Lab Review:          Hospital Outpatient Visit on 04/15/2024   Component Date Value     Case Report 04/15/2024                      Value:Surgical Pathology                                Case: [id number]                                  Authorizing Provider:  Carla Fernandes MD          Collected:           04/15/2024 0851              Ordering Location:     [facility]    Received:            04/15/2024 1035            "   Pathologist:           Angeles Holland MD                                                            Specimens:   A) - COLON -CECUM POLYP, COLD SNARE                                                                 B) - COLON - ASCENDING POLYP, COLD SNARE X 3                                                FINAL DIAGNOSIS 04/15/2024                      Value:This result contains rich text formatting which cannot be displayed here.      04/15/2024                      Value:This result contains rich text formatting which cannot be displayed here.    Gross Description 04/15/2024                      Value:This result contains rich text formatting which cannot be displayed here.          Assessment/Plan   Safety Assessment: no current or past SI, no SA, no guns. RF include age, male gender, long history of depression. PF include , connected family, limited substance use history future focused, hopeful. low risk     Diagnoses and all orders for this visit:  Patient voiced no concerns today with no complaint of depression, anxiety, insomnia, or appetite problems. Patient feeling less anxious due to his decision to retire next Spring. Will continue current medications and follow up in 3 months to monitor symptoms.     Greater than 50% of today's appointment consisted of supportive therapy, counseling, psych-education, and care coordination .   Recurrent major depressive disorder, in full remission (CMS/[facility])  CONTINUE : 450 mg Wellbutrin daily for depression  CONTINUE: 250 mg Zoloft daily for depression and anxiety        Follow up in 3 months June 7, at  9 am     Time 2 minutes charting   Total Patient Contact 20 minutes   Charting 5 minutes    Total time 27 minutes

## 2025-03-01 NOTE — PATIENT INSTRUCTIONS
1. Reviewed diagnostic impression including subjective and objective data and provided education about depression and anxiety disorder, etiology, treatment recommendations including medication, therapy, course of treatment and prognosis. Patient amendable to treatment plan.     2. Recommendations - Congrats on your [facility] decision. Continue current medications and follow up in June      CONTINUE : 450 mg Wellbutrin daily for depression  CONTINUE: 250 mg Zoloft daily for depression and anxiety     Maintain exercise and consider light box therapy for the winter months.      Reviewed r/b/a, possible side effects of the medication(s). Client is aware benefit/risks      4. Labs reviewed and discussed     5. Follow up with physical health providers as scheduled     6.. Follow up in 3 months June 7  at 9 am      May follow up sooner if experiences worsening symptoms by calling  Psychiatry at [phone]      Patient verbalized an understanding to call [facility] at [phone] ([facility]), [phone], or 650/go to the nearest emergency room if experiences thoughts of harm to self or others.

## 2025-03-04 ENCOUNTER — APPOINTMENT (OUTPATIENT)
Dept: PRIMARY CARE | Facility: CLINIC | Age: 60
End: 2025-03-04
Payer: COMMERCIAL

## 2025-03-04 VITALS
HEART RATE: 72 BPM | WEIGHT: 205 LBS | DIASTOLIC BLOOD PRESSURE: 78 MMHG | OXYGEN SATURATION: 98 % | BODY MASS INDEX: 29.41 KG/M2 | SYSTOLIC BLOOD PRESSURE: 136 MMHG | RESPIRATION RATE: 18 BRPM

## 2025-03-04 DIAGNOSIS — F41.8 DEPRESSION WITH ANXIETY: ICD-10-CM

## 2025-03-04 DIAGNOSIS — G47.19 EXCESSIVE DAYTIME SLEEPINESS: ICD-10-CM

## 2025-03-04 DIAGNOSIS — Z00.00 HEALTH CARE MAINTENANCE: ICD-10-CM

## 2025-03-04 DIAGNOSIS — G47.33 OSA (OBSTRUCTIVE SLEEP APNEA): ICD-10-CM

## 2025-03-04 DIAGNOSIS — R53.83 OTHER FATIGUE: Primary | ICD-10-CM

## 2025-03-04 DIAGNOSIS — E78.49 OTHER HYPERLIPIDEMIA: ICD-10-CM

## 2025-03-04 PROCEDURE — 99214 OFFICE O/P EST MOD 30 MIN: CPT | Performed by: INTERNAL MEDICINE

## 2025-03-04 PROCEDURE — 3075F SYST BP GE 130 - 139MM HG: CPT | Performed by: INTERNAL MEDICINE

## 2025-03-04 PROCEDURE — 3078F DIAST BP <80 MM HG: CPT | Performed by: INTERNAL MEDICINE

## 2025-03-04 PROCEDURE — 1036F TOBACCO NON-USER: CPT | Performed by: INTERNAL MEDICINE

## 2025-03-04 PROCEDURE — 99396 PREV VISIT EST AGE 40-64: CPT | Performed by: INTERNAL MEDICINE

## 2025-03-04 RX ORDER — ATORVASTATIN CALCIUM 80 MG/1
80 TABLET, FILM COATED ORAL DAILY
Qty: 90 TABLET | Refills: 3 | Status: SHIPPED | OUTPATIENT
Start: 2025-03-04 | End: 2026-03-04

## 2025-03-04 RX ORDER — BUPROPION HYDROCHLORIDE 300 MG/1
300 TABLET ORAL EVERY MORNING
COMMUNITY
Start: 2024-12-14

## 2025-03-04 ASSESSMENT — ENCOUNTER SYMPTOMS
DYSPHORIC MOOD: 1
SHORTNESS OF BREATH: 0
SLEEP DISTURBANCE: 1
PALPITATIONS: 0
OCCASIONAL FEELINGS OF UNSTEADINESS: 0
FATIGUE: 1
LOSS OF SENSATION IN FEET: 0
DEPRESSION: 0
UNEXPECTED WEIGHT CHANGE: 0
ARTHRALGIAS: 0
GASTROINTESTINAL NEGATIVE: 1

## 2025-03-04 ASSESSMENT — PATIENT HEALTH QUESTIONNAIRE - PHQ9
2. FEELING DOWN, DEPRESSED OR HOPELESS: NOT AT ALL
1. LITTLE INTEREST OR PLEASURE IN DOING THINGS: NOT AT ALL
SUM OF ALL RESPONSES TO PHQ9 QUESTIONS 1 AND 2: 0
1. LITTLE INTEREST OR PLEASURE IN DOING THINGS: NOT AT ALL
2. FEELING DOWN, DEPRESSED OR HOPELESS: NOT AT ALL
SUM OF ALL RESPONSES TO PHQ9 QUESTIONS 1 AND 2: 0

## 2025-03-04 NOTE — PROGRESS NOTES
Subjective   Patient ID: Garfield Blankenship is a 59 y.o. male who presents for Annual Exam.    HPI 60 yo w m for annual    Exercise pickle ball     Review of Systems   Constitutional:  Positive for fatigue (Extreme daytime fatigue sleeps 9 hours sometimes.  No difficulty falling asleep.  He has untreated sleep apnea and was intolerant to all of the CPAP mask has not been back to sleep medicine.). Negative for unexpected weight change.   Eyes:  Negative for visual disturbance.   Respiratory:  Negative for shortness of breath.    Cardiovascular:  Negative for chest pain, palpitations and leg swelling.   Gastrointestinal: Negative.    Endocrine: Negative for cold intolerance and heat intolerance.   Genitourinary: Negative.    Musculoskeletal:  Negative for arthralgias.   Skin:  Negative for rash.   Neurological:  Negative for syncope.   Psychiatric/Behavioral:  Positive for dysphoric mood and sleep disturbance. Negative for suicidal ideas.         Psych note reviewed with patient they feel his depression is under adequate control and probably not contributing to his fatigue however that should be included in the differential diagnosis       Objective   /78 (BP Location: Left arm, Patient Position: Sitting)   Pulse 72   Resp 18   Wt 93 kg (205 lb)   SpO2 98%   BMI 29.41 kg/m²     Physical Exam  Constitutional:       Appearance: Normal appearance.   HENT:      Right Ear: Tympanic membrane normal.      Left Ear: There is impacted cerumen.   Eyes:      General: No scleral icterus.  Neck:      Vascular: No carotid bruit.      Comments: Thyroid normal  Cardiovascular:      Rate and Rhythm: Regular rhythm.      Heart sounds: Normal heart sounds.   Pulmonary:      Breath sounds: Normal breath sounds.   Abdominal:      General: Bowel sounds are normal.      Palpations: Abdomen is soft.      Tenderness: There is no abdominal tenderness.      Hernia: No hernia is present.   Genitourinary:     Penis: Normal.       Testes:  Normal.   Musculoskeletal:      Right lower leg: No edema.      Left lower leg: No edema.   Neurological:      Mental Status: He is alert and oriented to person, place, and time.   Psychiatric:         Mood and Affect: Mood normal.         Behavior: Behavior normal.         Assessment/Plan   Diagnoses and all orders for this visit:  Other fatigue  Differential diagnosis hypothyroidism low testosterone untreated sleep apnea this is most likely, depression anemia  -     TSH; Future  -     Testosterone, total and free; Future  -     CBC and Auto Differential; Future  -     Referral to Adult Sleep Medicine; Future revisit with sleep medicine the sometimes is some medications that can help with your daytime fatigue although they are controlled for example Provigil  Other hyperlipidemia  -     atorvastatin (Lipitor) 80 mg tablet; Take 1 tablet (80 mg) by mouth once daily.  -     Lipid Panel; Future  Health care maintenance  -     Comprehensive Metabolic Panel; Future  -     Prostate Specific Antigen; Future  Depression with anxiety  - After blood work and sleep medicine consultation I can touch base with your psychiatrist regarding medications to help with your daytime sleepiness  Excessive daytime sleepiness  - As above  KINA (obstructive sleep apnea)-as above

## 2025-03-05 LAB
ALBUMIN SERPL-MCNC: 4.9 G/DL (ref 3.6–5.1)
ALP SERPL-CCNC: 64 U/L (ref 35–144)
ALT SERPL-CCNC: 19 U/L (ref 9–46)
ANION GAP SERPL CALCULATED.4IONS-SCNC: 10 MMOL/L (CALC) (ref 7–17)
AST SERPL-CCNC: 22 U/L (ref 10–35)
BASOPHILS # BLD AUTO: 80 CELLS/UL (ref 0–200)
BASOPHILS NFR BLD AUTO: 1.5 %
BILIRUB SERPL-MCNC: 0.6 MG/DL (ref 0.2–1.2)
BUN SERPL-MCNC: 16 MG/DL (ref 7–25)
CALCIUM SERPL-MCNC: 9.6 MG/DL (ref 8.6–10.3)
CHLORIDE SERPL-SCNC: 105 MMOL/L (ref 98–110)
CHOLEST SERPL-MCNC: 148 MG/DL
CHOLEST/HDLC SERPL: 4.1 (CALC)
CO2 SERPL-SCNC: 27 MMOL/L (ref 20–32)
CREAT SERPL-MCNC: 1.04 MG/DL (ref 0.7–1.35)
EGFRCR SERPLBLD CKD-EPI 2021: 82 ML/MIN/1.73M2
EOSINOPHIL # BLD AUTO: 350 CELLS/UL (ref 15–500)
EOSINOPHIL NFR BLD AUTO: 6.6 %
ERYTHROCYTE [DISTWIDTH] IN BLOOD BY AUTOMATED COUNT: 13.2 % (ref 11–15)
GLUCOSE SERPL-MCNC: 97 MG/DL (ref 65–99)
HCT VFR BLD AUTO: 46.1 % (ref 38.5–50)
HDLC SERPL-MCNC: 36 MG/DL
HGB BLD-MCNC: 15.2 G/DL (ref 13.2–17.1)
LDLC SERPL CALC-MCNC: 92 MG/DL (CALC)
LYMPHOCYTES # BLD AUTO: 1362 CELLS/UL (ref 850–3900)
LYMPHOCYTES NFR BLD AUTO: 25.7 %
MCH RBC QN AUTO: 29.2 PG (ref 27–33)
MCHC RBC AUTO-ENTMCNC: 33 G/DL (ref 32–36)
MCV RBC AUTO: 88.7 FL (ref 80–100)
MONOCYTES # BLD AUTO: 546 CELLS/UL (ref 200–950)
MONOCYTES NFR BLD AUTO: 10.3 %
NEUTROPHILS # BLD AUTO: 2963 CELLS/UL (ref 1500–7800)
NEUTROPHILS NFR BLD AUTO: 55.9 %
NONHDLC SERPL-MCNC: 112 MG/DL (CALC)
PLATELET # BLD AUTO: 269 THOUSAND/UL (ref 140–400)
PMV BLD REES-ECKER: 9.8 FL (ref 7.5–12.5)
POTASSIUM SERPL-SCNC: 4.1 MMOL/L (ref 3.5–5.3)
PROT SERPL-MCNC: 7.1 G/DL (ref 6.1–8.1)
PSA SERPL-MCNC: 1.53 NG/ML
RBC # BLD AUTO: 5.2 MILLION/UL (ref 4.2–5.8)
SODIUM SERPL-SCNC: 142 MMOL/L (ref 135–146)
TESTOST FREE SERPL-MCNC: NORMAL PG/ML
TESTOST SERPL-MCNC: NORMAL NG/DL
TRIGL SERPL-MCNC: 108 MG/DL
TSH SERPL-ACNC: 1.14 MIU/L (ref 0.4–4.5)
WBC # BLD AUTO: 5.3 THOUSAND/UL (ref 3.8–10.8)

## 2025-03-11 LAB
ALBUMIN SERPL-MCNC: 4.9 G/DL (ref 3.6–5.1)
ALP SERPL-CCNC: 64 U/L (ref 35–144)
ALT SERPL-CCNC: 19 U/L (ref 9–46)
ANION GAP SERPL CALCULATED.4IONS-SCNC: 10 MMOL/L (CALC) (ref 7–17)
AST SERPL-CCNC: 22 U/L (ref 10–35)
BASOPHILS # BLD AUTO: 80 CELLS/UL (ref 0–200)
BASOPHILS NFR BLD AUTO: 1.5 %
BILIRUB SERPL-MCNC: 0.6 MG/DL (ref 0.2–1.2)
BUN SERPL-MCNC: 16 MG/DL (ref 7–25)
CALCIUM SERPL-MCNC: 9.6 MG/DL (ref 8.6–10.3)
CHLORIDE SERPL-SCNC: 105 MMOL/L (ref 98–110)
CHOLEST SERPL-MCNC: 148 MG/DL
CHOLEST/HDLC SERPL: 4.1 (CALC)
CO2 SERPL-SCNC: 27 MMOL/L (ref 20–32)
CREAT SERPL-MCNC: 1.04 MG/DL (ref 0.7–1.35)
EGFRCR SERPLBLD CKD-EPI 2021: 82 ML/MIN/1.73M2
EOSINOPHIL # BLD AUTO: 350 CELLS/UL (ref 15–500)
EOSINOPHIL NFR BLD AUTO: 6.6 %
ERYTHROCYTE [DISTWIDTH] IN BLOOD BY AUTOMATED COUNT: 13.2 % (ref 11–15)
GLUCOSE SERPL-MCNC: 97 MG/DL (ref 65–99)
HCT VFR BLD AUTO: 46.1 % (ref 38.5–50)
HDLC SERPL-MCNC: 36 MG/DL
HGB BLD-MCNC: 15.2 G/DL (ref 13.2–17.1)
LDLC SERPL CALC-MCNC: 92 MG/DL (CALC)
LYMPHOCYTES # BLD AUTO: 1362 CELLS/UL (ref 850–3900)
LYMPHOCYTES NFR BLD AUTO: 25.7 %
MCH RBC QN AUTO: 29.2 PG (ref 27–33)
MCHC RBC AUTO-ENTMCNC: 33 G/DL (ref 32–36)
MCV RBC AUTO: 88.7 FL (ref 80–100)
MONOCYTES # BLD AUTO: 546 CELLS/UL (ref 200–950)
MONOCYTES NFR BLD AUTO: 10.3 %
NEUTROPHILS # BLD AUTO: 2963 CELLS/UL (ref 1500–7800)
NEUTROPHILS NFR BLD AUTO: 55.9 %
NONHDLC SERPL-MCNC: 112 MG/DL (CALC)
PLATELET # BLD AUTO: 269 THOUSAND/UL (ref 140–400)
PMV BLD REES-ECKER: 9.8 FL (ref 7.5–12.5)
POTASSIUM SERPL-SCNC: 4.1 MMOL/L (ref 3.5–5.3)
PROT SERPL-MCNC: 7.1 G/DL (ref 6.1–8.1)
PSA SERPL-MCNC: 1.53 NG/ML
RBC # BLD AUTO: 5.2 MILLION/UL (ref 4.2–5.8)
SODIUM SERPL-SCNC: 142 MMOL/L (ref 135–146)
TESTOST FREE SERPL-MCNC: 76 PG/ML (ref 35–155)
TESTOST SERPL-MCNC: 559 NG/DL (ref 250–1100)
TRIGL SERPL-MCNC: 108 MG/DL
TSH SERPL-ACNC: 1.14 MIU/L (ref 0.4–4.5)
WBC # BLD AUTO: 5.3 THOUSAND/UL (ref 3.8–10.8)

## 2025-06-07 ENCOUNTER — APPOINTMENT (OUTPATIENT)
Dept: BEHAVIORAL HEALTH | Facility: CLINIC | Age: 60
End: 2025-06-07
Payer: COMMERCIAL

## 2025-06-07 DIAGNOSIS — E11.9 TYPE 2 DIABETES MELLITUS WITHOUT COMPLICATION, WITHOUT LONG-TERM CURRENT USE OF INSULIN: ICD-10-CM

## 2025-06-07 DIAGNOSIS — F33.42 RECURRENT MAJOR DEPRESSIVE DISORDER, IN FULL REMISSION: ICD-10-CM

## 2025-06-07 DIAGNOSIS — J45.990 EXERCISE-INDUCED BRONCHOSPASM (HHS-HCC): ICD-10-CM

## 2025-06-07 PROCEDURE — 1036F TOBACCO NON-USER: CPT

## 2025-06-07 PROCEDURE — 99214 OFFICE O/P EST MOD 30 MIN: CPT

## 2025-06-07 NOTE — PROGRESS NOTES
"Patient ID: Garfield Blankenship is a 59  y.o. male  with past history of allergic rhinitis, anxiety, depression, vitamin D deficiency, and KINA with poor CPAP compliance. Patient presents for follow up evaluation of depression and anxiety      \"Chief Complaint -  Follow up for depression and anxiety      Patient provided 2 personal identifiers prior to virtual Teleconferencing appointment.      The patient may retire in  of next year.     He doesn't process stress in a healthy matter. His daughter had a recent gynecological problem and he immediately thinks the worst.     Patient's father  of a heart attack when patient was 9. Attributed his shock at his father's death to always thinking that the other shoe will drop. He expects bad things happening    Patient is sleeping well and has no appetite problems.     Not feeling depressed, he describes his mood as pretty good.     No SI or HI.     Anxiety and worries but no panic attacks.     No new medical problems or diagnoses.     Depressive Symptoms: Ongoing fatigue, not depressed or irritable mood, ~no loss of interest,~no change in appetite,~no recent lb weight gain,~no recent lb weight loss,~no insomnia,~~not feeling worthless or guilty,~normal concentration,~ability to make decisions,~no suicidal ideation,~no guns or weapons in household.   Manic Symptoms: mood is not irritable or elevated,~self esteem is not grandiose or increased,~no changes in need for sleep,~not more talkative than usual,~does not have flight of ideas or racing thoughts,~no distractibility,~no psychomotor agitation or increased goal-directed activity,~no excessive involvement in pleasurable activities.   Psychotic Symptoms: no hallucinations,~no delusions,~no disorganized speech,~does not have disorganized behavior or catatonia,~no negative symptoms.   Anxiety Symptoms: no worries today,  difficulty controlling worry, some obsession, no panic attacks,~no concerns about future panic attacks,~no " worry about panic attack consequences,~no change in behavior due to panic attacks,~no increase in arousal,~not easily fatigued due to worry,~no difficulty concentrating due to worry,~no irritability due to worry,~no muscle tension due to worry,~no sleep disturbances due to worry,~no specific phobia,~no social phobia,~no obsessions,~no compulsions,~no exposure to traumatic event,~no re-experiencing of traumatic event,~no avoidance of stimuli and number of responsiveness,~no restlessness / feeling on edge due to worry.   Delirium/ Altered Mental Status Symptoms: no disturbances of consciousness,~no diminished ability to focus, sustain, shift attention,~no change in cognition or perceptual disturbances,~symptoms do not fluctuate during the course of the day,~general medical condition is not present.   Disordered Eating Symptoms: weight is not less than 85% of ideal body weight.   Post-traumatic stress disorder symptoms The patient is currently asymptomatic.   innattentive Symptoms: does not make careless mistakes often,~does not have difficulty paying attention,~not often disorganized,~does not lose things often,~is not easily distracted,~is not often forgetful,~does not avoid/dislike tasks with sustained mental effort,~listens when spoken to directly,~is able to follow instructions and finish schoolwork.   All other systems have been reviewed and are negative for complaint.      Constitutional: sleep apnea, but~normal sleeping,~no night wakings,~no snoring,~not a picky eater,~normal appetite,~no swallowing problems,~no night terrors,~no nightmares,~no restless sleep,~no snorts/gasps~and~no obesity.   Eyes: wears glasses/contacts.   ENT: no hearing loss.   Cardiovascular: no chest pain,~no palpitations~and~no syncope.   Respiratory: no wheezing~and~no asthma/reactive airway disease.   Gastrointestinal: no constipation,~no abdominal pain,~no nausea,~no vomiting,~no diarrhea,~no blood in stools~and~no reflux.  "  Genitourinary: no nocturnal enuresis~and~no incontinence.   Musculoskeletal: moving all extremities well and symmetrical,~no arthritis or joint problems,~no myalgias~and~no muscle weakness.   Neurological: no headache,~no head injury,~no seizures,~no staring spells~and~no loss of consciousness.   Endocrine: no temperature intolerance~and~good growth.   Hematologic/Lymphatic: no anemia~and~no lead poisoning.   All other systems have been reviewed and are negative for complaint.      Mental Status Exam     General: In no apparent distress, well developed, appears well nourished.   Appearance: casually dressed, well groomed, appears stated age.   Attitude: Calm, cooperative   Behavior: attentive, engaged, good eye contact.   Motor Activity: Gait not assessed - virtual,.   Speech: Regular rate, rhythm, volume and tone, spontaneous, fluent.   Mood: \"pretty good\".   Affect: Mildly blunted   .   Thought Process: Organized, linear, goal directed. Associations are logical.   Thought Content: Does not endorse suicidal or homicidal ideation, no delusions elicited.   Thought Perception: Does not endorse auditory or visual hallucinations, does not appear to be responding to hallucinatory stimuli.   Cognition: Alert, oriented x3. No deficits noted. Adequate fund of knowledge. No deficit in recent and remote memory. No deficits in attention, concentration or language.    Insight: Good, as patient recognizes symptoms of illness and need for recommended treatments.   Judgment: Can make reasonable decisions about ordinary activities of daily living and necessary medical care recommendations.      Lab Review:          Hospital Outpatient Visit on 04/15/2024   Component Date Value     Case Report 04/15/2024                      Value:Surgical Pathology                                Case: [id number]                                  Authorizing Provider:  Carla Fernandes MD          Collected:           04/15/2024 0851            "   Ordering Location:     [facility]    Received:            04/15/2024 1035              Pathologist:           Angeles Holland MD                                                            Specimens:   A) - COLON -CECUM POLYP, COLD SNARE                                                                 B) - COLON - ASCENDING POLYP, COLD SNARE X 3                                                FINAL DIAGNOSIS 04/15/2024                      Value:This result contains rich text formatting which cannot be displayed here.      04/15/2024                      Value:This result contains rich text formatting which cannot be displayed here.    Gross Description 04/15/2024                      Value:This result contains rich text formatting which cannot be displayed here.          Assessment/Plan   Safety Assessment: no current or past SI, no SA, no guns. RF include age, male gender, long history of depression. PF include , connected family, limited substance use history future focused, hopeful. low risk     Diagnoses and all orders for this visit: Patient discussed his habit of catastrophizing but he denies new or worsening symmptom of anxiety or depression. Will continue current medications and follow up in August    Greater than 50% of today's appointment consisted of supportive therapy, counseling, psych-education, and care coordination .   Recurrent major depressive disorder, in full remission (CMS/[facility])  CONTINUE : 450 mg Wellbutrin daily for depression  CONTINUE: 250 mg Zoloft daily for depression and anxiety        Follow up August 23 at  9 am     Time 2 minutes charting   Total Patient Contact 25 minutes   Charting 5 minutes    Total time 32 minutes

## 2025-06-07 NOTE — PATIENT INSTRUCTIONS
1. Reviewed diagnostic impression including subjective and objective data and provided education about depression and anxiety disorder, etiology, treatment recommendations including medication, therapy, course of treatment and prognosis. Patient amendable to treatment plan.     2. Recommendations - You look good, enjoy the summer and I'll see you in August    CONTINUE : 450 mg Wellbutrin daily for depression  CONTINUE: 250 mg Zoloft daily for depression and anxiety     Maintain exercise and consider light box therapy for the winter months.      Reviewed r/b/a, possible side effects of the medication(s). Client is aware benefit/risks      4. Labs reviewed and discussed     5. Follow up with physical health providers as scheduled     6.. Follow up August 23 at 9 am      May follow up sooner if experiences worsening symptoms by calling  Psychiatry at [phone]      Patient verbalized an understanding to call [facility] at [phone] ([facility]), [phone], or [phone]/go to the nearest emergency room if experiences thoughts of harm to self or others.

## 2025-08-23 ENCOUNTER — APPOINTMENT (OUTPATIENT)
Dept: BEHAVIORAL HEALTH | Facility: CLINIC | Age: 60
End: 2025-08-23
Payer: COMMERCIAL

## 2025-08-23 DIAGNOSIS — F90.0 ATTENTION DEFICIT HYPERACTIVITY DISORDER (ADHD), PREDOMINANTLY INATTENTIVE TYPE: ICD-10-CM

## 2025-08-23 DIAGNOSIS — F33.42 RECURRENT MAJOR DEPRESSIVE DISORDER, IN FULL REMISSION: ICD-10-CM

## 2025-08-23 PROCEDURE — 1036F TOBACCO NON-USER: CPT

## 2025-08-23 PROCEDURE — 99214 OFFICE O/P EST MOD 30 MIN: CPT

## 2025-08-23 RX ORDER — DEXTROAMPHETAMINE SACCHARATE, AMPHETAMINE ASPARTATE MONOHYDRATE, DEXTROAMPHETAMINE SULFATE AND AMPHETAMINE SULFATE 2.5; 2.5; 2.5; 2.5 MG/1; MG/1; MG/1; MG/1
10 CAPSULE, EXTENDED RELEASE ORAL EVERY MORNING
Qty: 30 CAPSULE | Refills: 0 | Status: SHIPPED | OUTPATIENT
Start: 2025-08-23 | End: 2025-09-22

## 2025-09-06 DIAGNOSIS — F33.0 MILD EPISODE OF RECURRENT MAJOR DEPRESSIVE DISORDER: ICD-10-CM

## 2025-09-06 RX ORDER — BUPROPION HYDROCHLORIDE 300 MG/1
TABLET ORAL
Qty: 90 TABLET | Refills: 0 | Status: SHIPPED | OUTPATIENT
Start: 2025-09-06

## 2025-09-20 ENCOUNTER — APPOINTMENT (OUTPATIENT)
Dept: BEHAVIORAL HEALTH | Facility: CLINIC | Age: 60
End: 2025-09-20
Payer: COMMERCIAL